# Patient Record
Sex: MALE | Race: WHITE | Employment: OTHER | URBAN - METROPOLITAN AREA
[De-identification: names, ages, dates, MRNs, and addresses within clinical notes are randomized per-mention and may not be internally consistent; named-entity substitution may affect disease eponyms.]

---

## 2017-08-26 ENCOUNTER — HOSPITAL ENCOUNTER (INPATIENT)
Age: 67
LOS: 4 days | Discharge: HOME | DRG: 862 | End: 2017-08-30
Attending: EMERGENCY MEDICINE | Admitting: INTERNAL MEDICINE
Payer: MEDICARE

## 2017-08-26 ENCOUNTER — APPOINTMENT (OUTPATIENT)
Dept: GENERAL RADIOLOGY | Age: 67
DRG: 862 | End: 2017-08-26
Attending: EMERGENCY MEDICINE
Payer: MEDICARE

## 2017-08-26 ENCOUNTER — APPOINTMENT (OUTPATIENT)
Dept: ULTRASOUND IMAGING | Age: 67
DRG: 862 | End: 2017-08-26
Attending: INTERNAL MEDICINE
Payer: MEDICARE

## 2017-08-26 DIAGNOSIS — A41.9 SEPSIS, DUE TO UNSPECIFIED ORGANISM: Primary | ICD-10-CM

## 2017-08-26 PROBLEM — E87.20 LACTIC ACID ACIDOSIS: Status: ACTIVE | Noted: 2017-08-26

## 2017-08-26 PROBLEM — N41.9 PROSTATITIS: Status: ACTIVE | Noted: 2017-08-26

## 2017-08-26 PROBLEM — N39.0 COMPLICATED UTI (URINARY TRACT INFECTION): Status: ACTIVE | Noted: 2017-08-26

## 2017-08-26 PROBLEM — I10 HYPERTENSION: Chronic | Status: ACTIVE | Noted: 2017-08-26

## 2017-08-26 PROBLEM — N28.9 RENAL INSUFFICIENCY: Status: ACTIVE | Noted: 2017-08-26

## 2017-08-26 LAB
ALBUMIN SERPL-MCNC: 3.9 G/DL (ref 3.5–5)
ALBUMIN/GLOB SERPL: 1.1 {RATIO} (ref 1.1–2.2)
ALP SERPL-CCNC: 67 U/L (ref 45–117)
ALT SERPL-CCNC: 49 U/L (ref 12–78)
ANION GAP SERPL CALC-SCNC: 11 MMOL/L (ref 5–15)
APPEARANCE UR: CLEAR
AST SERPL-CCNC: 34 U/L (ref 15–37)
BACTERIA URNS QL MICRO: NEGATIVE /HPF
BASOPHILS # BLD: 0 K/UL (ref 0–0.1)
BASOPHILS NFR BLD: 0 % (ref 0–1)
BILIRUB SERPL-MCNC: 0.7 MG/DL (ref 0.2–1)
BILIRUB UR QL: NEGATIVE
BUN SERPL-MCNC: 14 MG/DL (ref 6–20)
BUN/CREAT SERPL: 10 (ref 12–20)
CALCIUM SERPL-MCNC: 9 MG/DL (ref 8.5–10.1)
CHLORIDE SERPL-SCNC: 101 MMOL/L (ref 97–108)
CO2 SERPL-SCNC: 26 MMOL/L (ref 21–32)
COLOR UR: ABNORMAL
CREAT SERPL-MCNC: 1.38 MG/DL (ref 0.7–1.3)
DIFFERENTIAL METHOD BLD: ABNORMAL
EOSINOPHIL # BLD: 0 K/UL (ref 0–0.4)
EOSINOPHIL NFR BLD: 0 % (ref 0–7)
EPITH CASTS URNS QL MICRO: ABNORMAL /LPF
ERYTHROCYTE [DISTWIDTH] IN BLOOD BY AUTOMATED COUNT: 13.1 % (ref 11.5–14.5)
GLOBULIN SER CALC-MCNC: 3.5 G/DL (ref 2–4)
GLUCOSE SERPL-MCNC: 126 MG/DL (ref 65–100)
GLUCOSE UR STRIP.AUTO-MCNC: NEGATIVE MG/DL
HCT VFR BLD AUTO: 42.4 % (ref 36.6–50.3)
HGB BLD-MCNC: 15 G/DL (ref 12.1–17)
HGB UR QL STRIP: ABNORMAL
HYALINE CASTS URNS QL MICRO: ABNORMAL /LPF (ref 0–5)
KETONES UR QL STRIP.AUTO: NEGATIVE MG/DL
LACTATE SERPL-SCNC: 1.8 MMOL/L (ref 0.4–2)
LACTATE SERPL-SCNC: 2.7 MMOL/L (ref 0.4–2)
LEUKOCYTE ESTERASE UR QL STRIP.AUTO: NEGATIVE
LYMPHOCYTES # BLD: 0.4 K/UL (ref 0.8–3.5)
LYMPHOCYTES NFR BLD: 6 % (ref 12–49)
MCH RBC QN AUTO: 30.2 PG (ref 26–34)
MCHC RBC AUTO-ENTMCNC: 35.4 G/DL (ref 30–36.5)
MCV RBC AUTO: 85.3 FL (ref 80–99)
MONOCYTES # BLD: 0.1 K/UL (ref 0–1)
MONOCYTES NFR BLD: 1 % (ref 5–13)
NEUTS SEG # BLD: 5.9 K/UL (ref 1.8–8)
NEUTS SEG NFR BLD: 93 % (ref 32–75)
NITRITE UR QL STRIP.AUTO: NEGATIVE
PH UR STRIP: 6 [PH] (ref 5–8)
PLATELET # BLD AUTO: 140 K/UL (ref 150–400)
POTASSIUM SERPL-SCNC: 4 MMOL/L (ref 3.5–5.1)
PROT SERPL-MCNC: 7.4 G/DL (ref 6.4–8.2)
PROT UR STRIP-MCNC: NEGATIVE MG/DL
RBC # BLD AUTO: 4.97 M/UL (ref 4.1–5.7)
RBC #/AREA URNS HPF: ABNORMAL /HPF (ref 0–5)
RBC MORPH BLD: ABNORMAL
SODIUM SERPL-SCNC: 138 MMOL/L (ref 136–145)
SP GR UR REFRACTOMETRY: 1.02 (ref 1–1.03)
TROPONIN I SERPL-MCNC: <0.04 NG/ML
UA: UC IF INDICATED,UAUC: ABNORMAL
UROBILINOGEN UR QL STRIP.AUTO: 0.2 EU/DL (ref 0.2–1)
WBC # BLD AUTO: 6.4 K/UL (ref 4.1–11.1)
WBC URNS QL MICRO: ABNORMAL /HPF (ref 0–4)

## 2017-08-26 PROCEDURE — 74011250637 HC RX REV CODE- 250/637: Performed by: EMERGENCY MEDICINE

## 2017-08-26 PROCEDURE — 93005 ELECTROCARDIOGRAM TRACING: CPT

## 2017-08-26 PROCEDURE — 99284 EMERGENCY DEPT VISIT MOD MDM: CPT

## 2017-08-26 PROCEDURE — 84484 ASSAY OF TROPONIN QUANT: CPT | Performed by: EMERGENCY MEDICINE

## 2017-08-26 PROCEDURE — 87086 URINE CULTURE/COLONY COUNT: CPT | Performed by: EMERGENCY MEDICINE

## 2017-08-26 PROCEDURE — 87186 SC STD MICRODIL/AGAR DIL: CPT | Performed by: EMERGENCY MEDICINE

## 2017-08-26 PROCEDURE — 71010 XR CHEST PORT: CPT

## 2017-08-26 PROCEDURE — 87040 BLOOD CULTURE FOR BACTERIA: CPT | Performed by: EMERGENCY MEDICINE

## 2017-08-26 PROCEDURE — 74011250636 HC RX REV CODE- 250/636: Performed by: EMERGENCY MEDICINE

## 2017-08-26 PROCEDURE — 83605 ASSAY OF LACTIC ACID: CPT | Performed by: EMERGENCY MEDICINE

## 2017-08-26 PROCEDURE — 85025 COMPLETE CBC W/AUTO DIFF WBC: CPT | Performed by: EMERGENCY MEDICINE

## 2017-08-26 PROCEDURE — 65270000029 HC RM PRIVATE

## 2017-08-26 PROCEDURE — 74011000258 HC RX REV CODE- 258: Performed by: INTERNAL MEDICINE

## 2017-08-26 PROCEDURE — 81001 URINALYSIS AUTO W/SCOPE: CPT | Performed by: EMERGENCY MEDICINE

## 2017-08-26 PROCEDURE — 74011250636 HC RX REV CODE- 250/636: Performed by: INTERNAL MEDICINE

## 2017-08-26 PROCEDURE — 36415 COLL VENOUS BLD VENIPUNCTURE: CPT | Performed by: EMERGENCY MEDICINE

## 2017-08-26 PROCEDURE — 87077 CULTURE AEROBIC IDENTIFY: CPT | Performed by: EMERGENCY MEDICINE

## 2017-08-26 PROCEDURE — 80053 COMPREHEN METABOLIC PANEL: CPT | Performed by: EMERGENCY MEDICINE

## 2017-08-26 PROCEDURE — 76770 US EXAM ABDO BACK WALL COMP: CPT

## 2017-08-26 PROCEDURE — 96365 THER/PROPH/DIAG IV INF INIT: CPT

## 2017-08-26 PROCEDURE — 74011000258 HC RX REV CODE- 258: Performed by: EMERGENCY MEDICINE

## 2017-08-26 PROCEDURE — 74011250637 HC RX REV CODE- 250/637: Performed by: INTERNAL MEDICINE

## 2017-08-26 RX ORDER — SODIUM CHLORIDE 0.9 % (FLUSH) 0.9 %
5-10 SYRINGE (ML) INJECTION EVERY 8 HOURS
Status: DISCONTINUED | OUTPATIENT
Start: 2017-08-26 | End: 2017-08-30 | Stop reason: HOSPADM

## 2017-08-26 RX ORDER — SILDENAFIL 100 MG/1
100 TABLET, FILM COATED ORAL AS NEEDED
COMMUNITY

## 2017-08-26 RX ORDER — HYDROMORPHONE HYDROCHLORIDE 1 MG/ML
0.5 INJECTION, SOLUTION INTRAMUSCULAR; INTRAVENOUS; SUBCUTANEOUS
Status: DISCONTINUED | OUTPATIENT
Start: 2017-08-26 | End: 2017-08-30 | Stop reason: HOSPADM

## 2017-08-26 RX ORDER — ACETAMINOPHEN 500 MG
1000 TABLET ORAL
Status: COMPLETED | OUTPATIENT
Start: 2017-08-26 | End: 2017-08-26

## 2017-08-26 RX ORDER — ONDANSETRON 2 MG/ML
4 INJECTION INTRAMUSCULAR; INTRAVENOUS
Status: COMPLETED | OUTPATIENT
Start: 2017-08-26 | End: 2017-08-26

## 2017-08-26 RX ORDER — ACETAMINOPHEN 325 MG/1
650 TABLET ORAL
Status: DISCONTINUED | OUTPATIENT
Start: 2017-08-26 | End: 2017-08-30 | Stop reason: HOSPADM

## 2017-08-26 RX ORDER — NALOXONE HYDROCHLORIDE 0.4 MG/ML
0.4 INJECTION, SOLUTION INTRAMUSCULAR; INTRAVENOUS; SUBCUTANEOUS AS NEEDED
Status: DISCONTINUED | OUTPATIENT
Start: 2017-08-26 | End: 2017-08-30 | Stop reason: HOSPADM

## 2017-08-26 RX ORDER — ACETAMINOPHEN 500 MG
1000 TABLET ORAL DAILY
COMMUNITY

## 2017-08-26 RX ORDER — IBUPROFEN 400 MG/1
400 TABLET ORAL
Status: DISPENSED | OUTPATIENT
Start: 2017-08-26 | End: 2017-08-27

## 2017-08-26 RX ORDER — LISINOPRIL AND HYDROCHLOROTHIAZIDE 12.5; 2 MG/1; MG/1
1 TABLET ORAL DAILY
COMMUNITY

## 2017-08-26 RX ORDER — SULFAMETHOXAZOLE AND TRIMETHOPRIM 800; 160 MG/1; MG/1
1 TABLET ORAL 2 TIMES DAILY
COMMUNITY
Start: 2017-08-25 | End: 2017-08-30

## 2017-08-26 RX ORDER — HYDROCODONE BITARTRATE AND ACETAMINOPHEN 5; 325 MG/1; MG/1
1 TABLET ORAL
Status: DISCONTINUED | OUTPATIENT
Start: 2017-08-26 | End: 2017-08-30 | Stop reason: HOSPADM

## 2017-08-26 RX ORDER — DIPHENHYDRAMINE HYDROCHLORIDE 50 MG/ML
12.5 INJECTION, SOLUTION INTRAMUSCULAR; INTRAVENOUS
Status: DISCONTINUED | OUTPATIENT
Start: 2017-08-26 | End: 2017-08-30 | Stop reason: HOSPADM

## 2017-08-26 RX ORDER — SODIUM CHLORIDE 9 MG/ML
100 INJECTION, SOLUTION INTRAVENOUS CONTINUOUS
Status: DISCONTINUED | OUTPATIENT
Start: 2017-08-26 | End: 2017-08-29

## 2017-08-26 RX ORDER — HEPARIN SODIUM 5000 [USP'U]/ML
5000 INJECTION, SOLUTION INTRAVENOUS; SUBCUTANEOUS EVERY 8 HOURS
Status: DISCONTINUED | OUTPATIENT
Start: 2017-08-26 | End: 2017-08-30 | Stop reason: HOSPADM

## 2017-08-26 RX ORDER — SODIUM CHLORIDE 0.9 % (FLUSH) 0.9 %
5-10 SYRINGE (ML) INJECTION AS NEEDED
Status: DISCONTINUED | OUTPATIENT
Start: 2017-08-26 | End: 2017-08-28 | Stop reason: SDUPTHER

## 2017-08-26 RX ORDER — HYDROCHLOROTHIAZIDE 12.5 MG/1
12.5 CAPSULE ORAL DAILY
COMMUNITY
End: 2017-08-26

## 2017-08-26 RX ORDER — ONDANSETRON 2 MG/ML
4 INJECTION INTRAMUSCULAR; INTRAVENOUS
Status: DISCONTINUED | OUTPATIENT
Start: 2017-08-26 | End: 2017-08-30 | Stop reason: HOSPADM

## 2017-08-26 RX ORDER — DOCUSATE SODIUM 100 MG/1
100 CAPSULE, LIQUID FILLED ORAL 2 TIMES DAILY
Status: DISCONTINUED | OUTPATIENT
Start: 2017-08-26 | End: 2017-08-30 | Stop reason: HOSPADM

## 2017-08-26 RX ORDER — SODIUM CHLORIDE 0.9 % (FLUSH) 0.9 %
5-10 SYRINGE (ML) INJECTION AS NEEDED
Status: DISCONTINUED | OUTPATIENT
Start: 2017-08-26 | End: 2017-08-30 | Stop reason: HOSPADM

## 2017-08-26 RX ADMIN — SODIUM CHLORIDE 125 ML/HR: 900 INJECTION, SOLUTION INTRAVENOUS at 15:18

## 2017-08-26 RX ADMIN — ACETAMINOPHEN 650 MG: 325 TABLET ORAL at 19:46

## 2017-08-26 RX ADMIN — SODIUM CHLORIDE 2124 ML: 900 INJECTION, SOLUTION INTRAVENOUS at 14:14

## 2017-08-26 RX ADMIN — PIPERACILLIN SODIUM,TAZOBACTAM SODIUM 3.38 G: 3; .375 INJECTION, POWDER, FOR SOLUTION INTRAVENOUS at 14:55

## 2017-08-26 RX ADMIN — CEFTRIAXONE SODIUM 1 G: 1 INJECTION, POWDER, FOR SOLUTION INTRAMUSCULAR; INTRAVENOUS at 14:15

## 2017-08-26 RX ADMIN — Medication 10 ML: at 15:18

## 2017-08-26 RX ADMIN — PIPERACILLIN SODIUM,TAZOBACTAM SODIUM 3.38 G: 3; .375 INJECTION, POWDER, FOR SOLUTION INTRAVENOUS at 22:32

## 2017-08-26 RX ADMIN — DOCUSATE SODIUM 100 MG: 100 CAPSULE ORAL at 17:05

## 2017-08-26 RX ADMIN — HEPARIN SODIUM 5000 UNITS: 5000 INJECTION, SOLUTION INTRAVENOUS; SUBCUTANEOUS at 22:32

## 2017-08-26 RX ADMIN — Medication 10 ML: at 22:33

## 2017-08-26 RX ADMIN — ACETAMINOPHEN 1000 MG: 500 TABLET ORAL at 14:19

## 2017-08-26 RX ADMIN — ONDANSETRON 4 MG: 2 INJECTION INTRAMUSCULAR; INTRAVENOUS at 14:17

## 2017-08-26 RX ADMIN — SODIUM CHLORIDE 125 ML/HR: 900 INJECTION, SOLUTION INTRAVENOUS at 21:15

## 2017-08-26 NOTE — H&P
Godwin Ackerman [facility] [address], [place], [address] [facility]  [phone]    Admission History and Physical      NAME:  True Herrera   :   1950   MRN:  [id number]     PCP:  No primary care provider on file. Date/Time:  2017         Subjective:     CHIEF COMPLAINT: fevers, chills     HISTORY OF PRESENT ILLNESS:     The patient is a 70 yo hx of HTN, prostate CA, presented w/ sepsis, UTI/prostatitis. The patient received a prostate Bx 2 days ago and developed fevers and chills x1 day. His Urologist prescribed oral Bactrim yesterday, but his symptoms have gotten worse. Denied chest pain, SOB, nausea, vomiting, diarrhea. In the ED, temp was 101.0, . WBC 6.4, Cr 1.38. Allergies   Allergen Reactions    Ciprofloxacin Other (comments)     Joint aches       Prior to Admission medications    Medication Sig Start Date End Date Taking? Authorizing Provider   LISINOPRIL PO Take  by mouth. Yes Historical Provider   hydroCHLOROthiazide (MICROZIDE) 12.5 mg capsule Take 12.5 mg by mouth daily.    Yes Historical Provider       Past Medical History:   Diagnosis Date    HTN (hypertension)     Prostate CA ([address]         Past Surgical History:   Procedure Laterality Date    HX ORTHOPAEDIC         Social History   Substance Use Topics    Smoking status: Never Smoker    Smokeless tobacco: Never Used    Alcohol use Yes        Family History   Problem Relation Age of Onset    Prostate Cancer Brother         Review of Systems:  (bold if positive, if negative)    Gen:  , fever, chills, Eyes:  ENT:  CVS:  Pulm:  GI:    :    MS:  Skin:  Psych:  Endo:    Hem:  Renal:    Neuro:          Objective:      VITALS:    Vital signs reviewed; most recent are:    Visit Vitals    /65    Pulse (!) 106    Temp (!) 101 °F (38.3 °C)    Resp 30    Ht 5' 8\" (1.727 m)    Wt 70.8 kg (156 lb)    SpO2 92%    BMI 23.72 kg/m2     SpO2 Readings from Last 6 Encounters:   17 92% No intake or output data in the 24 hours ending 08/26/17 4157     Exam:     Physical Exam:    Gen:  Well-developed, well-nourished, in mild distress  HEENT:  Pink conjunctivae, PERRL, hearing intact to voice, moist mucous membranes  Neck:  Supple, without masses, thyroid non-tender  Resp:  No accessory muscle use, clear breath sounds without wheezes rales or rhonchi  Card: Tachy, no murmurs, normal S1, S2 without thrills, bruits or peripheral edema  Abd:  Soft, non-tender, non-distended, normoactive bowel sounds are present  Lymph:  No cervical adenopathy  Musc:  No cyanosis or clubbing  Skin:  No rashes  Neuro:  Cranial nerves 3-12 are grossly intact, follows commands appropriately  Psych:  Alert with good insight. Oriented to person, place, and time    Labs:    Recent Labs      08/26/17   1346   WBC  6.4   HGB  15.0   HCT  42.4   PLT  140*     Recent Labs      08/26/17   1346   NA  138   K  4.0   CL  101   CO2  26   GLU  126*   BUN  14   CREA  1.38*   CA  9.0   ALB  3.9   TBILI  0.7   SGOT  34   ALT  49     No results found for: GLUCPOC  No results for input(s): PH, PCO2, PO2, HCO3, FIO2 in the last 72 hours. No results for input(s): INR in the last 72 hours. No lab exists for component: INREXT    Radiology and EKG reviewed:   none    **Old Records reviewed in [facility]**       Assessment/Plan:       Principal Problem:    68 yo hx of HTN, prostate CA, presented w/ sepsis, UTI/prostatitis    1) Sepsis/Complicated UTI (urinary tract infection)/Prostatitis: 2/4 SIRS on admission. Due to recent prostate Bx. Failed outpatient Bactrim. Will check blood Cx, urine Cx. Empirically start on IV Zosyn. Consult Urology    2) Lactic acid acidosis: due to above. Will cont IVF, monitor lactate    3) Renal insufficiency: due to sepsis, dehydration. Will check renal U/S.   Cont IVF, monitor BMP    4) Hypertension: hold lisinopril/HCTZ due to sepsis    Code: Full    Risk of deterioration: high      Total time spent with patient: 60 min                  Care Plan discussed with: Patient, family, nursing    Discussed:  Care Plan    Prophylaxis:  Hep SQ    Probable Disposition:  Home w/Family           ___________________________________________________    Attending Physician: Carlton Greene MD

## 2017-08-26 NOTE — IP AVS SNAPSHOT
Summary of Care Report The Summary of Care report has been created to help improve care coordination. Users with access to [email or web address] [address] (Web-based application) may access additional patient information including the Discharge Summary. If you are not currently a [address] user and need more information, please call the number listed below in the [place] section and ask to be connected with Medical Records. Facility Information Name Address Phone [place] [id number] [phone] Patient Information Patient Name Sex CHRISTOFER Marquez ([id number]) Male 1950 Discharge Information Admitting Provider Service Area Unit Marilee Fraire MD / Josefina 2 / [phone] Discharge Provider Discharge Date/Time Discharge Disposition Destination (none) 2017 (Pending) AHR (none) Patient Language Language ENGLISH [13] Hospital Problems as of 2017  Reviewed: 2017 10:53 AM by Jona Torres MD  
  
  
  
 Class Noted - Resolved Last Modified POA Active Problems Prostatitis  2017 - Present 2017 by Marilee Fraire MD Yes Entered by Marilee Fraire MD  
  Lactic acid acidosis  2017 - Present 2017 by Marilee Fraire MD Yes Entered by Marilee Fraire MD  
  Renal insufficiency  2017 - Present 2017 by Marilee Fraire MD Yes Entered by Marilee Fraire MD  
  Hypertension (Chronic)  2017 - Present 2017 by Marilee Fraire MD No  
  Entered by Mairlee Fraire MD  
  
Non-Hospital Problems as of 2017  Reviewed: 2017 10:53 AM by Jona Torres MD  
 None You are allergic to the following Allergen Reactions Ciprofloxacin Other (comments) Joint aches Current Discharge Medication List  
  
 START taking these medications Dose & Instructions Dispensing Information Comments  
 cefdinir 300 mg capsule Commonly known as:  OMNICEF Dose:  300 mg Take 1 Cap by mouth two (2) times a day. Quantity:  20 Cap Refills:  0 Lactobacillus acidophilus Cap Commonly known as:  BACID Dose:  2 Cap Take 2 Caps by mouth two (2) times a day. Quantity:  20 Cap Refills:  0 CONTINUE these medications which have NOT CHANGED Dose & Instructions Dispensing Information Comments  
 lisinopril-hydroCHLOROthiazide 20-12.5 mg per tablet Commonly known as:  Terryl Range Dose:  1 Tab Take 1 Tab by mouth daily. Refills:  0  
   
 sildenafil citrate 100 mg tablet Commonly known as:  VIAGRA Dose:  100 mg Take 100 mg by mouth as needed. Refills:  0  
   
 TYLENOL EXTRA STRENGTH 500 mg tablet Generic drug:  acetaminophen Dose:  1000 mg Take 1,000 mg by mouth daily. Refills:  0 STOP taking these medications Comments BACTRIM -800 mg per tablet Generic drug:  trimethoprim-sulfamethoxazole Follow-up Information Follow up With Details Comments Contact Info Phys Other, MD   Patient can only remember the practice name and not the physician Discharge Instructions ACUTE DIAGNOSES: 
Sepsis ([facility] CHRONIC MEDICAL DIAGNOSES: 
Problem List as of 8/30/2017  Date Reviewed: 8/30/2017 Codes Class Noted - Resolved Prostatitis ICD-10-CM: N41.9 ICD-9-CM: 601.9  8/26/2017 - Present Lactic acid acidosis ICD-10-CM: E87.2 ICD-9-CM: 276.2  8/26/2017 - Present Renal insufficiency ICD-10-CM: N28.9 ICD-9-CM: 593.9  8/26/2017 - Present Hypertension (Chronic) ICD-10-CM: I10 
ICD-9-CM: 401.9  8/26/2017 - Present * (Principal)RESOLVED: Sepsis ([facility] ICD-10-CM: A41.9 ICD-9-CM: 038.9, 995.91  8/26/2017 - 8/30/2017 RESOLVED: Complicated UTI (urinary tract infection) ICD-10-CM: N39.0 ICD-9-CM: 599.0  8/26/2017 - 8/30/2017 DISCHARGE MEDICATIONS:  
  
 
 
· It is important that you take the medication exactly as they are prescribed. · Keep your medication in the bottles provided by the pharmacist and keep a list of the medication names, dosages, and times to be taken in your wallet. · Do not take other medications without consulting your doctor. DIET:  Regular Diet ACTIVITY: Activity as tolerated ADDITIONAL INFORMATION: If you experience any of the following symptoms then please call your primary care physician or return to the emergency room if you cannot get hold of your doctor: Fever, chills, nausea, vomiting, diarrhea, change in mentation, falling, bleeding, shortness of breath. FOLLOW UP CARE: 
Dr. Keenan Holter Other, MD  you are to call and set up an appointment to see them in 2 weeks. Follow-up with Dr Rukhsana Murcia in 1-2 weeks Information obtained by : 
I understand that if any problems occur once I am at home I am to contact my physician. I understand and acknowledge receipt of the instructions indicated above. Physician's or R.N.'s Signature                                                                  Date/Time Patient or Representative Signature                                                          Date/Time Chart Review Routing History No Routing History on File

## 2017-08-26 NOTE — ED NOTES
Pt informed this RN he is not supposed to take ibuprofen this week d/t a prostate biopsy he had on THursday. ROSANNA Carrasco informed. 4:08 PM  RN called Dr. Sylvia Velazco and informed him that pt will not take ibuprofen today d/t urologists warning.

## 2017-08-26 NOTE — IP AVS SNAPSHOT
[address] 
[phone] Patient: Sushant Landers MRN: [id number] OOX:0/84/8885 You are allergic to the following Allergen Reactions Ciprofloxacin Other (comments) Joint aches Recent Documentation Height Weight BMI Smoking Status 1.727 m 88 kg 29.5 kg/m2 Never Smoker Unresulted Labs Order Current Status CULTURE, BLOOD Preliminary result CULTURE, BLOOD Preliminary result CULTURE, STOOL Preliminary result Emergency Contacts Name Discharge Info Relation Home Work Mobile DerianKarolina DISCHARGE CAREGIVER [3] Spouse [3] [phone] About your hospitalization You were admitted on:  August 26, 2017 You last received care in the:  [facility]  MED SURG 2 You were discharged on:  August 30, 2017 Unit phone number:  [phone] Why you were hospitalized Your primary diagnosis was:  Sepsis (H[facility]) Your diagnoses also included:  Complicated Uti (Urinary Tract Infection), Prostatitis, Lactic Acid Acidosis, Renal Insufficiency, Hypertension Providers Seen During Your Hospitalizations Provider Role Specialty Primary office phone Alejandra Vaughn MD Attending Provider Emergency Medicine [phone] Laura Goldman MD Attending Provider Internal Medicine [phone] Natalia Eugene MD Attending Provider Hospitalist [phone] Your Primary Care Physician (PCP) Primary Care Physician Office Phone Office Fax OTHER, PHYS ** None ** ** None ** Follow-up Information Follow up With Details Comments Contact Info Rodolfo Khan MD   Patient can only remember the practice name and not the physician Current Discharge Medication List  
  
START taking these medications Dose & Instructions Dispensing Information Comments Morning Noon Evening Bedtime  
 cefdinir 300 mg capsule Commonly known as:  OMNICEF Your last dose was: Your next dose is:    
   
   
 Dose:  300 mg Take 1 Cap by mouth two (2) times a day. Quantity:  20 Cap Refills:  0 Lactobacillus acidophilus Cap Commonly known as:  BACID Your last dose was: Your next dose is:    
   
   
 Dose:  2 Cap Take 2 Caps by mouth two (2) times a day. Quantity:  20 Cap Refills:  0 CONTINUE these medications which have NOT CHANGED Dose & Instructions Dispensing Information Comments Morning Noon Evening Bedtime  
 lisinopril-hydroCHLOROthiazide 20-12.5 mg per tablet Commonly known as:  Lyle Ailey Your last dose was: Your next dose is:    
   
   
 Dose:  1 Tab Take 1 Tab by mouth daily. Refills:  0  
     
   
   
   
  
 sildenafil citrate 100 mg tablet Commonly known as:  VIAGRA Your last dose was: Your next dose is:    
   
   
 Dose:  100 mg Take 100 mg by mouth as needed. Refills:  0  
     
   
   
   
  
 TYLENOL EXTRA STRENGTH 500 mg tablet Generic drug:  acetaminophen Your last dose was: Your next dose is:    
   
   
 Dose:  1000 mg Take 1,000 mg by mouth daily. Refills:  0 STOP taking these medications BACTRIM -800 mg per tablet Generic drug:  trimethoprim-sulfamethoxazole Where to Get Your Medications Information on where to get these meds will be given to you by the nurse or doctor. ! Ask your nurse or doctor about these medications  
  cefdinir 300 mg capsule Lactobacillus acidophilus Cap Discharge Instructions ACUTE DIAGNOSES: 
Sepsis ([facility] CHRONIC MEDICAL DIAGNOSES: 
Problem List as of 8/30/2017  Date Reviewed: 8/30/2017 Codes Class Noted - Resolved Prostatitis ICD-10-CM: N41.9 ICD-9-CM: 601.9  8/26/2017 - Present Lactic acid acidosis ICD-10-CM: E87.2 ICD-9-CM: 276.2  8/26/2017 - Present Renal insufficiency ICD-10-CM: N28.9 ICD-9-CM: 593.9  8/26/2017 - Present Hypertension (Chronic) ICD-10-CM: I10 
ICD-9-CM: 401.9  8/26/2017 - Present * (Principal)RESOLVED: Sepsis ([address] ICD-10-CM: A41.9 ICD-9-CM: 038.9, 995.91  8/26/2017 - 8/30/2017 RESOLVED: Complicated UTI (urinary tract infection) ICD-10-CM: N39.0 ICD-9-CM: 599.0  8/26/2017 - 8/30/2017 DISCHARGE MEDICATIONS:  
  
 
 
· It is important that you take the medication exactly as they are prescribed. · Keep your medication in the bottles provided by the pharmacist and keep a list of the medication names, dosages, and times to be taken in your wallet. · Do not take other medications without consulting your doctor. DIET:  Regular Diet ACTIVITY: Activity as tolerated ADDITIONAL INFORMATION: If you experience any of the following symptoms then please call your primary care physician or return to the emergency room if you cannot get hold of your doctor: Fever, chills, nausea, vomiting, diarrhea, change in mentation, falling, bleeding, shortness of breath. FOLLOW UP CARE: 
Dr. Dell Khan MD  you are to call and set up an appointment to see them in 2 weeks. Follow-up with Dr Laine Colivn in 1-2 weeks Information obtained by : 
I understand that if any problems occur once I am at home I am to contact my physician. I understand and acknowledge receipt of the instructions indicated above. Physician's or R.N.'s Signature                                                                  Date/Time Patient or Representative Signature                                                          Date/Time Discharge Orders None [employer] Announcement We are excited to announce that we are making your provider's discharge notes available to you in [employer]. You will see these notes when they are completed and signed by the physician that discharged you from your recent hospital stay. If you have any questions or concerns about any information you see in [employer], please call the Health Information Department where you were seen or reach out to your Primary Care Provider for more information about your plan of care. Introducing [facility]! Zeinab Casanova introduces [employer] patient portal. Now you can access parts of your medical record, email your doctor's office, and request medication refills online. 1. In your internet browser, go to [email or web address] 2. Click on the First Time User? Click Here link in the Sign In box. You will see the New Member Sign Up page. 3. Enter your [employer] Access Code exactly as it appears below. You will not need to use this code after youve completed the sign-up process. If you do not sign up before the expiration date, you must request a new code. · [employer] Access Code: [email or web address] Expires: 11/28/2017  3:01 PM 
 
4. Enter the last four digits of your Social Security Number (xxxx) and Date of Birth (mm/dd/yyyy) as indicated and click Submit. You will be taken to the next sign-up page. 5. Create a [employer] ID. This will be your [employer] login ID and cannot be changed, so think of one that is secure and easy to remember. 6. Create a [employer] password. You can change your password at any time. 7. Enter your Password Reset Question and Answer. This can be used at a later time if you forget your password. 8. Enter your e-mail address. You will receive e-mail notification when new information is available in [address] 9. Click Sign Up. You can now view and download portions of your medical record. 10. Click the Download Summary menu link to download a portable copy of your medical information. If you have questions, please visit the Frequently Asked Questions section of the [employer] website. Remember, [employer] is NOT to be used for urgent needs. For medical emergencies, dial 911. Now available from your iPhone and Android! General Information Please provide this summary of care documentation to your next provider. Patient Signature:  ____________________________________________________________ Date:  ____________________________________________________________  
  
Kaveh Snipe Provider Signature:  ____________________________________________________________ Date:  ____________________________________________________________

## 2017-08-26 NOTE — IP AVS SNAPSHOT
Sandra Omar 
 
 
 [facility] [address] [facility] 
[phone] Patient: Martha Alvarado MRN: [id number] GALDINO:[birth year] Current Discharge Medication List  
  
START taking these medications Dose & Instructions Dispensing Information Comments Morning Noon Evening Bedtime  
 cefdinir 300 mg capsule Commonly known as:  OMNICEF Your last dose was: Your next dose is:    
   
   
 Dose:  300 mg Take 1 Cap by mouth two (2) times a day. Quantity:  20 Cap Refills:  0 Lactobacillus acidophilus Cap Commonly known as:  BACID Your last dose was: Your next dose is:    
   
   
 Dose:  2 Cap Take 2 Caps by mouth two (2) times a day. Quantity:  20 Cap Refills:  0 CONTINUE these medications which have NOT CHANGED Dose & Instructions Dispensing Information Comments Morning Noon Evening Bedtime  
 lisinopril-hydroCHLOROthiazide 20-12.5 mg per tablet Commonly known as:  Wellborn Sabsalvatore Your last dose was: Your next dose is:    
   
   
 Dose:  1 Tab Take 1 Tab by mouth daily. Refills:  0  
     
   
   
   
  
 sildenafil citrate 100 mg tablet Commonly known as:  VIAGRA Your last dose was: Your next dose is:    
   
   
 Dose:  100 mg Take 100 mg by mouth as needed. Refills:  0  
     
   
   
   
  
 TYLENOL EXTRA STRENGTH 500 mg tablet Generic drug:  acetaminophen Your last dose was: Your next dose is:    
   
   
 Dose:  1000 mg Take 1,000 mg by mouth daily. Refills:  0 STOP taking these medications BACTRIM -800 mg per tablet Generic drug:  trimethoprim-sulfamethoxazole Where to Get Your Medications Information on where to get these meds will be given to you by the nurse or doctor. ! Ask your nurse or doctor about these medications cefdinir 300 mg capsule Lactobacillus acidophilus Cap

## 2017-08-26 NOTE — ED NOTES
Dr. Darwin Laureano at bedside. Dr. Darwin Laureano aware rocephin already started as ordered before he D/C ordered. Dr. Darwin Laureano states, \"okay no problem. \"

## 2017-08-26 NOTE — ED NOTES
TRANSFER - OUT REPORT:    Verbal report given to ROSANNA Carrasco on Sushant Landers  being transferred to medical unit for routine progression of care       Report consisted of patients Situation, Background, Assessment and   Recommendations(SBAR). Information from the following report(s) SBAR, Kardex, ED Summary, MAR, Recent Results and Med Rec Status was reviewed with the receiving nurse. Lines:   Peripheral IV 08/26/17 Left Antecubital (Active)   Site Assessment Clean, dry, & intact 8/26/2017  1:58 PM   Phlebitis Assessment 0 8/26/2017  1:58 PM   Infiltration Assessment 0 8/26/2017  1:58 PM   Dressing Status Clean, dry, & intact; New 8/26/2017  1:58 PM   Hub Color/Line Status Pink;Flushed;Patent 8/26/2017  1:58 PM   Action Taken Blood drawn 8/26/2017  1:58 PM        Opportunity for questions and clarification was provided. Code Purple Transfer SBAR        SIRS Criteria Temp (degrees) >100.9 F (38.9 C) or <90.8 F (36 C), HR >90 bpm    Mental Status Level of Consciousness: Alert    Labs/Lactic Acid Lactate 1 result: 2.7, Repeat Lactate due: If yes, time due: will draw prior to patient going to the floor    Fluid resuscitation total 2124mL Infusing. RN Barbara Zhong states she will start second bag on the floor. Antibiotics Due?   NO    Vasopressor Hanging  No    Visit Vitals    /49    Pulse 86    Temp (!) 101 °F (38.3 °C)    Resp 22    Ht 5' 8\" (1.727 m)    Wt 70.8 kg (156 lb)    SpO2 91%    BMI 23.72 kg/m2

## 2017-08-26 NOTE — PROGRESS NOTES
[facility]: MED RECONCILIATION    Comments/Recommendations: Wife to return shortly with medication bottle and will confirm dose of lisinopril-hctz. MD will be updated accordingly    Medications added:   · Tylenol  · Viagra      Information obtained from: patient      Allergies: Ciprofloxacin    Prior to Admission Medications:     Medication Documentation Review Audit       Reviewed by Conor Sebastian (Pharmacist) on 08/26/17 at 1449         Medication Sig Documenting Provider Last Dose Status Taking?      acetaminophen (TYLENOL EXTRA STRENGTH) 500 mg tablet Take 1,000 mg by mouth daily. Historical Provider 8/25/2017 AM Active Yes    LISINOPRIL-HYDROCHLOROTHIAZIDE PO Take 1 Tab by mouth daily. Historical Provider 8/25/2017 AM Active Yes    sildenafil citrate (VIAGRA) 100 mg tablet Take 100 mg by mouth as needed. Historical Provider  Active Yes    trimethoprim-sulfamethoxazole (BACTRIM DS) 160-800 mg per tablet Take 1 Tab by mouth two (2) times a day. Historical Provider 8/25/2017 PM Active Yes                    Thank you,  Lulu Cuevas, Pharm. D.

## 2017-08-26 NOTE — CONSULTS
Urology  8/26/17  Sepsis post prostate biopsy. Note dictated. Agree with IV abx and fluid resussitation. Will follow.   Merlinda Hamilton MD

## 2017-08-26 NOTE — ED TRIAGE NOTES
Prostate biopsy on Thursday. Started with chills and fever last night. Vomited x 1 in triage. Called on call MD, was put on bactrim last night. Took 2 bactrim at 2200 and 0700.  No SOB

## 2017-08-26 NOTE — ED PROVIDER NOTES
HPI Comments: 69 y.o. male with past medical history significant for diverticulitis, s/p prostate biopsy who presents accompanied by wife with chief complaint of fever. Patient complains of fever, chills, fatigue/weakness, and slight nausea that began last night around 2100. Patient states the chills began first. Patient states his temperature has not fluctuated much. Patient states he's had two episodes of diarrhea today and vomited slightly while in the ED. Patient states he feels worse today. Patient had a prostate biopsy on 8/24/17 with [facility] and was started on Bactrim 800 mg after calling the on-call urologist last night. Patient states he's had two doses so far. Patient states he last took 2 Tylenol 350 mg around 0915 without much change. Patient denies urinary sx. There are no other acute medical concerns at this time. PCP: No primary care provider on file. Urologist: FLAQUITO Morgan MD     Note written by Didi Chaudhary, as dictated by Lula Heath MD 1:35 PM     The history is provided by the patient. No  was used. Past Medical History:   Diagnosis Date    HTN (hypertension)     Prostate CA ([address]        Past Surgical History:   Procedure Laterality Date    HX ORTHOPAEDIC           Family History:   Problem Relation Age of Onset    Prostate Cancer Brother        Social History     Social History    Marital status: N/A     Spouse name: N/A    Number of children: N/A    Years of education: N/A     Occupational History    Not on file. Social History Main Topics    Smoking status: Never Smoker    Smokeless tobacco: Never Used    Alcohol use Yes    Drug use: No    Sexual activity: Not on file     Other Topics Concern    Not on file     Social History Narrative    No narrative on file         ALLERGIES: Ciprofloxacin    Review of Systems   Constitutional: Positive for chills, fatigue and fever. Negative for appetite change.    HENT: Negative for congestion, rhinorrhea and sore throat. Respiratory: Negative for cough and shortness of breath. Cardiovascular: Negative for chest pain and leg swelling. Gastrointestinal: Positive for diarrhea, nausea and vomiting. Negative for abdominal pain and constipation. Genitourinary: Negative for difficulty urinating and dysuria. Musculoskeletal: Negative for back pain and neck pain. Skin: Negative for rash and wound. Neurological: Negative for headaches. All other systems reviewed and are negative. Vitals:    08/26/17 1325 08/26/17 1400 08/26/17 1427 08/26/17 1428   BP: 134/65 157/65     Pulse: (!) 113 (!) 121 (!) 106    Resp: 20 26 30    Temp: (!) 101 °F (38.3 °C)      SpO2: 96%  99% 92%   Weight: 70.8 kg (156 lb)      Height: 5' 8\" (1.727 m)               Physical Exam   Constitutional: He is oriented to person, place, and time. He appears well-developed and well-nourished. He appears ill (moderately). HENT:   Head: Normocephalic and atraumatic. Eyes: Conjunctivae are normal. No scleral icterus. Neck: Neck supple. No tracheal deviation present. Cardiovascular: Regular rhythm, normal heart sounds and intact distal pulses. Tachycardia present. Exam reveals no gallop and no friction rub. No murmur heard. Pulmonary/Chest: Effort normal and breath sounds normal. He has no wheezes. He has no rales. Abdominal: Soft. He exhibits no distension. There is no tenderness. There is no rebound and no guarding. Musculoskeletal: He exhibits no edema. Neurological: He is alert and oriented to person, place, and time. Skin: Skin is warm and dry. No rash noted. Psychiatric: He has a normal mood and affect. Nursing note and vitals reviewed. Note written by Didi Rosas, as dictated by Yanick Erickson MD 1:35 PM      [facility]  ED Course       Procedures    ED EKG interpretation:  Rhythm: sinus tachycardia. Rate (approx.): 120; RBBB; Inferior Q waves.   Note written by Camden Hui Scribe, as dictated by Manjit Carrillo MD 2:05 PM     CONSULT NOTE:  2:23 Aubrey Chinchilla MD spoke with Dr. Adi Gil, Consult for Hospitalist.  Discussed available diagnostic tests and clinical findings. He is in agreement with care plans as outlined. Dr. Adi Gil will admit for sepsis s/p prostate biopsy. CONSULT NOTE:  2:46 PM Manjit Carrillo MD spoke with Dr. Tristian Hernandez, Consult for Urology. Discussed available diagnostic tests and clinical findings. He is in agreement with care plans as outlined. Dr. Tristian Hernandez agrees with admission to hospitalist and he will see him tomorrow. Total critical care time spend exclusive of procedures:  35 min. Manjit Carrillo MD  3:16 PM    A/P: sepsis S/P prostate Bx - getting fluids and broad-spectrum AB's; BP stable thus far; lactate - 2.7; admit.   Manjit Carrillo MD  3:17 PM

## 2017-08-27 LAB
ALBUMIN SERPL-MCNC: 2.8 G/DL (ref 3.5–5)
ALBUMIN/GLOB SERPL: 1 {RATIO} (ref 1.1–2.2)
ALP SERPL-CCNC: 40 U/L (ref 45–117)
ALT SERPL-CCNC: 69 U/L (ref 12–78)
ANION GAP SERPL CALC-SCNC: 8 MMOL/L (ref 5–15)
AST SERPL-CCNC: 51 U/L (ref 15–37)
BILIRUB DIRECT SERPL-MCNC: 0.1 MG/DL (ref 0–0.2)
BILIRUB SERPL-MCNC: 0.5 MG/DL (ref 0.2–1)
BUN SERPL-MCNC: 12 MG/DL (ref 6–20)
BUN/CREAT SERPL: 9 (ref 12–20)
CALCIUM SERPL-MCNC: 7.8 MG/DL (ref 8.5–10.1)
CHLORIDE SERPL-SCNC: 106 MMOL/L (ref 97–108)
CO2 SERPL-SCNC: 25 MMOL/L (ref 21–32)
CREAT SERPL-MCNC: 1.28 MG/DL (ref 0.7–1.3)
ERYTHROCYTE [DISTWIDTH] IN BLOOD BY AUTOMATED COUNT: 13.4 % (ref 11.5–14.5)
GLOBULIN SER CALC-MCNC: 2.8 G/DL (ref 2–4)
GLUCOSE SERPL-MCNC: 111 MG/DL (ref 65–100)
HCT VFR BLD AUTO: 36.5 % (ref 36.6–50.3)
HGB BLD-MCNC: 12.3 G/DL (ref 12.1–17)
LACTATE SERPL-SCNC: 0.8 MMOL/L (ref 0.4–2)
MAGNESIUM SERPL-MCNC: 1.7 MG/DL (ref 1.6–2.4)
MCH RBC QN AUTO: 29.2 PG (ref 26–34)
MCHC RBC AUTO-ENTMCNC: 33.7 G/DL (ref 30–36.5)
MCV RBC AUTO: 86.7 FL (ref 80–99)
PHOSPHATE SERPL-MCNC: 2.8 MG/DL (ref 2.6–4.7)
PLATELET # BLD AUTO: 110 K/UL (ref 150–400)
POTASSIUM SERPL-SCNC: 3.8 MMOL/L (ref 3.5–5.1)
PROT SERPL-MCNC: 5.6 G/DL (ref 6.4–8.2)
RBC # BLD AUTO: 4.21 M/UL (ref 4.1–5.7)
SODIUM SERPL-SCNC: 139 MMOL/L (ref 136–145)
WBC # BLD AUTO: 6.6 K/UL (ref 4.1–11.1)

## 2017-08-27 PROCEDURE — 84100 ASSAY OF PHOSPHORUS: CPT | Performed by: INTERNAL MEDICINE

## 2017-08-27 PROCEDURE — 74011250637 HC RX REV CODE- 250/637: Performed by: INTERNAL MEDICINE

## 2017-08-27 PROCEDURE — 83605 ASSAY OF LACTIC ACID: CPT | Performed by: INTERNAL MEDICINE

## 2017-08-27 PROCEDURE — 80048 BASIC METABOLIC PNL TOTAL CA: CPT | Performed by: INTERNAL MEDICINE

## 2017-08-27 PROCEDURE — 80076 HEPATIC FUNCTION PANEL: CPT | Performed by: INTERNAL MEDICINE

## 2017-08-27 PROCEDURE — 74011000258 HC RX REV CODE- 258: Performed by: INTERNAL MEDICINE

## 2017-08-27 PROCEDURE — 85027 COMPLETE CBC AUTOMATED: CPT | Performed by: INTERNAL MEDICINE

## 2017-08-27 PROCEDURE — 74011250636 HC RX REV CODE- 250/636: Performed by: INTERNAL MEDICINE

## 2017-08-27 PROCEDURE — 83735 ASSAY OF MAGNESIUM: CPT | Performed by: INTERNAL MEDICINE

## 2017-08-27 PROCEDURE — 65270000029 HC RM PRIVATE

## 2017-08-27 PROCEDURE — 36415 COLL VENOUS BLD VENIPUNCTURE: CPT | Performed by: INTERNAL MEDICINE

## 2017-08-27 RX ORDER — IBUPROFEN 400 MG/1
400 TABLET ORAL
Status: DISCONTINUED | OUTPATIENT
Start: 2017-08-27 | End: 2017-08-30 | Stop reason: HOSPADM

## 2017-08-27 RX ADMIN — IBUPROFEN 400 MG: 400 TABLET, FILM COATED ORAL at 14:09

## 2017-08-27 RX ADMIN — ACETAMINOPHEN 650 MG: 325 TABLET ORAL at 00:21

## 2017-08-27 RX ADMIN — HYDROCODONE BITARTRATE AND ACETAMINOPHEN 1 TABLET: 5; 325 TABLET ORAL at 01:14

## 2017-08-27 RX ADMIN — HEPARIN SODIUM 5000 UNITS: 5000 INJECTION, SOLUTION INTRAVENOUS; SUBCUTANEOUS at 22:05

## 2017-08-27 RX ADMIN — DOCUSATE SODIUM 100 MG: 100 CAPSULE ORAL at 09:34

## 2017-08-27 RX ADMIN — PIPERACILLIN SODIUM,TAZOBACTAM SODIUM 3.38 G: 3; .375 INJECTION, POWDER, FOR SOLUTION INTRAVENOUS at 22:05

## 2017-08-27 RX ADMIN — SODIUM CHLORIDE 125 ML/HR: 900 INJECTION, SOLUTION INTRAVENOUS at 13:27

## 2017-08-27 RX ADMIN — HEPARIN SODIUM 5000 UNITS: 5000 INJECTION, SOLUTION INTRAVENOUS; SUBCUTANEOUS at 06:41

## 2017-08-27 RX ADMIN — ACETAMINOPHEN 650 MG: 325 TABLET ORAL at 07:39

## 2017-08-27 RX ADMIN — PIPERACILLIN SODIUM,TAZOBACTAM SODIUM 3.38 G: 3; .375 INJECTION, POWDER, FOR SOLUTION INTRAVENOUS at 06:40

## 2017-08-27 RX ADMIN — ACETAMINOPHEN 650 MG: 325 TABLET ORAL at 15:53

## 2017-08-27 RX ADMIN — PIPERACILLIN SODIUM,TAZOBACTAM SODIUM 3.38 G: 3; .375 INJECTION, POWDER, FOR SOLUTION INTRAVENOUS at 14:07

## 2017-08-27 RX ADMIN — SODIUM CHLORIDE 125 ML/HR: 900 INJECTION, SOLUTION INTRAVENOUS at 22:05

## 2017-08-27 RX ADMIN — Medication 10 ML: at 06:41

## 2017-08-27 RX ADMIN — Medication 10 ML: at 22:05

## 2017-08-27 RX ADMIN — HEPARIN SODIUM 5000 UNITS: 5000 INJECTION, SOLUTION INTRAVENOUS; SUBCUTANEOUS at 13:27

## 2017-08-27 NOTE — PROGRESS NOTES
Urology  No pain. Voiding without difficulty. Some malaise. Intermittent chills and fever. Awake alert NAD  Temp 102.5 BP ok  Abd soft nontender. Blood cultures positive for GNR. A: sepsis post biopsy. Cont abx. Elevated creat. Better.   Marissa Verma MD

## 2017-08-27 NOTE — PROGRESS NOTES
[facility] Dosing Services: Antimicrobial Stewardship Daily Doc    Consult for antibiotic dosing of Vancomycin by Dr. Nicky James  Indication: Bacteremia  Day of Therapy 1    Vancomycin therapy:  LD: 1500mg x1  MD: 750mg q 12 hrs  Dose calculated to approximate a therapeutic trough of 15-20 mcg/mL. Plan for level / Adjustment in Therapy:  Order prior to 3rd MD (not done yet)        Other Antimicrobial   (not dosed by pharmacist) Zosyn 3.375gm q 8hrs   Cultures 8/26: Blood- 1/2 btl + for growth- pending  8/26: Urine- pending   Serum Creatinine Lab Results   Component Value Date/Time    Creatinine 1.28 08/27/2017 05:10 AM         Creatinine Clearance Estimated Creatinine Clearance: 52.7 mL/min (based on Cr of 1.28). Temp Temp: (!) 102.5 °F (39.2 °C)         WBC Lab Results   Component Value Date/Time    WBC 6.6 08/27/2017 05:10 AM        H/H Lab Results   Component Value Date/Time    HGB 12.3 08/27/2017 05:10 AM        Platelets    Lab Results   Component Value Date/Time    PLATELET 978 60/31/8171 05:10 AM          Thank you,  Agata Samuel, Pharm. D.

## 2017-08-27 NOTE — PROGRESS NOTES
[facility]  [address]  [phone]      Medical Progress Note      NAME: Keagan Monaco   :  1950  MRM:  [id number]    Date/Time: 2017         Subjective:     Chief Complaint:  Patient was seen and examined by me. Chart reviewed. Still with fevers, but subjectively felt better       Objective:       Vitals:       Last 24hrs VS reviewed since prior progress note. Most recent are:    Visit Vitals    /59 (BP 1 Location: Right arm, BP Patient Position: At rest)    Pulse (!) 56    Temp 99.8 °F (37.7 °C)    Resp 16    Ht 5' 8\" (1.727 m)    Wt 70.8 kg (156 lb)    SpO2 95%    BMI 23.72 kg/m2     SpO2 Readings from Last 6 Encounters:   17 95%          Intake/Output Summary (Last 24 hours) at 17 1156  Last data filed at 17 0640   Gross per 24 hour   Intake             1936 ml   Output                0 ml   Net             1936 ml        Exam:     Physical Exam:    Gen:  Well-developed, well-nourished, in NAD  HEENT:  Pink conjunctivae, PERRL, hearing intact to voice, moist mucous membranes  Neck:  Supple, without masses, thyroid non-tender  Resp:  No accessory muscle use, clear breath sounds without wheezes rales or rhonchi  Card: Tachy, no murmurs, normal S1, S2 without thrills, bruits or peripheral edema  Abd:  Soft, non-tender, non-distended, normoactive bowel sounds are present  Lymph:  No cervical adenopathy  Musc:  No cyanosis or clubbing  Skin:  No rashes  Neuro:  Cranial nerves 3-12 are grossly intact, follows commands appropriately  Psych:  Alert with good insight.   Oriented to person, place, and time    Medications Reviewed: (see below)    Lab Data Reviewed: (see below)    ______________________________________________________________________    Medications:     Current Facility-Administered Medications   Medication Dose Route Frequency    ibuprofen (MOTRIN) tablet 400 mg  400 mg Oral Q8H PRN    sodium chloride (NS) flush 5-10 mL  5-10 mL IntraVENous PRN    piperacillin-tazobactam (ZOSYN) 3.375 g in 0.9% sodium chloride (MBP/ADV) 100 mL  3.375 g IntraVENous Q8H    0.9% sodium chloride infusion  125 mL/hr IntraVENous CONTINUOUS    sodium chloride (NS) flush 5-10 mL  5-10 mL IntraVENous Q8H    sodium chloride (NS) flush 5-10 mL  5-10 mL IntraVENous PRN    acetaminophen (TYLENOL) tablet 650 mg  650 mg Oral Q4H PRN    HYDROcodone-acetaminophen (NORCO) 5-325 mg per tablet 1 Tab  1 Tab Oral Q4H PRN    HYDROmorphone (PF) (DILAUDID) injection 0.5 mg  0.5 mg IntraVENous Q4H PRN    naloxone (NARCAN) injection 0.4 mg  0.4 mg IntraVENous PRN    diphenhydrAMINE (BENADRYL) injection 12.5 mg  12.5 mg IntraVENous Q4H PRN    ondansetron (ZOFRAN) injection 4 mg  4 mg IntraVENous Q6H PRN    docusate sodium (COLACE) capsule 100 mg  100 mg Oral BID    heparin (porcine) injection 5,000 Units  5,000 Units SubCUTAneous Q8H          Lab Review:     Recent Labs      08/27/17   0510  08/26/17   1346   WBC  6.6  6.4   HGB  12.3  15.0   HCT  36.5*  42.4   PLT  110*  140*     Recent Labs      08/27/17   0510  08/26/17   1346   NA  139  138   K  3.8  4.0   CL  106  101   CO2  25  26   GLU  111*  126*   BUN  12  14   CREA  1.28  1.38*   CA  7.8*  9.0   MG  1.7   --    PHOS  2.8   --    ALB  2.8*  3.9   TBILI  0.5  0.7   SGOT  51*  34   ALT  69  49     No results found for: GLUCPOC       Assessment / Plan:     68 yo hx of HTN, prostate CA, presented w/ sepsis, UTI/prostatitis     1) Sepsis/Complicated UTI (urinary tract infection)/Prostatitis/bacteremia: sepsis POA, still present. Due to recent prostate Bx. Failed outpatient Bactrim. Blood Cx growing 3/4 bottles with gram neg rods. Will repeat blood Cx. Cont IV Zosyn. Urology following     2) Lactic acid acidosis: due to above. Resolved with IVF     3) Renal insufficiency: due to sepsis, dehydration. Renal U/S unremarkable.   Improving with IVF, will monitor BMP     4) Hypertension: hold lisinopril/HCTZ due to sepsis     Code: Full    Total time spent with patient: 35 min                  Care Plan discussed with: Patient, nursing    Discussed:  Care Plan    Prophylaxis:  Hep SQ    Disposition:  Home w/Family           ___________________________________________________    Attending Physician: Shaniqua Guillen MD

## 2017-08-27 NOTE — PROGRESS NOTES
[facility]: MED RECONCILIATION     Comments/Recommendations:   Please see original noted filed by another pharmacist 8/26/17 14:54  Pill bottle arrive and the dose of lisinopril/hctz confirmed to be 20/12.5mg daily  The PTA list has been updated    Prior to Admission Medications   Prescriptions Last Dose Informant Patient Reported? Taking?   acetaminophen (TYLENOL EXTRA STRENGTH) 500 mg tablet 8/25/2017 at AM  Yes Yes   Sig: Take 1,000 mg by mouth daily. lisinopril-hydroCHLOROthiazide (PRINZIDE, ZESTORETIC) 20-12.5 mg per tablet 8/25/2017 at Unknown time  Yes Yes   Sig: Take 1 Tab by mouth daily. sildenafil citrate (VIAGRA) 100 mg tablet   Yes Yes   Sig: Take 100 mg by mouth as needed. trimethoprim-sulfamethoxazole (BACTRIM DS) 160-800 mg per tablet 8/26/2017 at am  Yes Yes   Sig: Take 1 Tab by mouth two (2) times a day.  X 10 days started 8/25/17      Facility-Administered Medications: None      Thank you      Flavia Brown, PharmD, BCPS

## 2017-08-27 NOTE — PROGRESS NOTES
Problem: Falls - Risk of  Goal: *Absence of Falls  Document Cesar Fall Risk and appropriate interventions in the flowsheet.    Outcome: Progressing Towards Goal  Fall Risk Interventions:              Medication Interventions: Patient to call before getting OOB

## 2017-08-27 NOTE — PROGRESS NOTES
Problem: Falls - Risk of  Goal: *Absence of Falls  Document Cesar Fall Risk and appropriate interventions in the flowsheet.    Outcome: Progressing Towards Goal  Fall Risk Interventions:

## 2017-08-27 NOTE — CONSULTS
[facility]   [address] [facility], [address]   [address] [facility]       Name:  Rachel Figueroa   MR#:  [id number]   :  1950   Account #:  [de-identified]    Date of Consultation:  2017   Date of Adm:  2017       REQUESTING PHYSICIAN: Cruz GARCIA Do, MD.    CHIEF COMPLAINT: Fever, post transrectal ultrasound and prostate   biopsy. HISTORY: This is a 68-year-old gentleman who has a history of   prostate cancer and had seen Dr. Lyn Silva recently. He was   evaluated and was noted to have low grade, low risk adenocarcinoma   of the prostate. He subsequently had a followup MRI of his prostate on   2017, showing a lesion suggesting a potential moderate-to-high   risk prostate cancer and was scheduled for transrectal ultrasound,   prostate biopsy. He did have this biopsy done on Thursday and reports   he had a small amount of bleeding after the biopsy when he voided,   and then he started developing fever, chills, malaise and weakness   and presented to the emergency department today. He had no   associated nausea or vomiting but just had this generalized malaise   and weakness and was not improving and presented to the emergency   room. His symptoms have been generalized, exacerbated apparently   with infection after biopsies with an elevated fever and malaise,   alleviated with IV fluids and antibiotics now as I am seeing him at this   time, and he is markedly improved. His history is reviewed. He did have no other associated symptoms   with this. No diarrhea, nausea, vomiting or any other associated   symptoms. PAST MEDICAL HISTORY: Reviewed. ALLERGIES: HE IS ALLERGIC TO CIPRO. MEDICATIONS    1. Lisinopril. 2. Microzide. 3. He was given IV antibiotics with his biopsy. His medical history is also significant for hypertension and low risk   prostate cancer, as mentioned.  He has had orthopedic surgery in the   past but does not have any artificial hips, knees, heart valves. SOCIAL HISTORY: No smoking and occasional alcohol use. REVIEW OF SYSTEMS: As per his chart and the patient. He did have   generalized malaise, which is markedly improved. He is not having any   pain anywhere, minimal testicular discomfort on contact but no other   significant symptoms. He did have a little transient hematuria. PHYSICAL EXAMINATION   GENERAL: He is alert, cooperative, oriented, in no acute distress   currently. VITAL SIGNS: His temperature at this time is 100 degrees, pulse 92. Blood pressure is 112/58. He did have a T-max of 103.2 earlier this   afternoon. HEENT: Normal calvarium without evidence of trauma. Pupils are   equal. Sclerae clear. Neck supple without adenopathy. Nose is normal.   CHEST: No labored breathing. CARDIAC: Regular. ABDOMEN: Soft without masses, tenderness, organomegaly or hernia. GENITAL: Normal testicles and cord structures and scrotal skin, phallic   skin. No inguinal hernia or adenopathy. EXTREMITIES: No edema. SKIN: Visualized portions of his skin are dry and intact. PSYCHIATRIC: Affect is normal.   NEUROLOGIC: Grossly intact. LABORATORY VALUES: White count 6.4, platelets 566. His CO2 is   26, glucose 126, creatinine 1.38. His retroperitoneal ultrasound done with his elevated creatinine   showed no evidence of hydronephrosis, normal echogenicity of the   kidneys, no evidence of stone. Urinary bladder was incompletely   distended. His urinalysis did show 10-20 red cells, 0-4 white cells. ASSESSMENT   1. Probable sepsis post-biopsy. I agree with admission, IV antibiotic   therapy, cultures, both urine and blood cultures. We will follow with   you.   2. History of hematuria, transient, expected post-biopsy. Would expect   this to resolve totally. 3. Elevated creatinine, probably secondary to dehydration. No   evidence of obstruction. Would recommend fluid resuscitation and   repeat labs.  Will follow. 4. Medical problems as outlined. 5. History of prostate cancer. Biopsies are pending at this time, and he   is going to see Dr. Arroyo Older back for further followup and discussion. We   will follow with you.         MD OSMEL Ibarra / Adam Huang   D:  08/26/2017   18:40   T:  08/26/2017   23:37   Job #:  [id number]

## 2017-08-27 NOTE — PROGRESS NOTES
Bedside and Verbal shift change report given to [address] (oncoming nurse) by Jayde Wilkerson RN (offgoing nurse).  Report included the following information SBAR, Kardex, Procedure Summary, Intake/Output, MAR and Recent Results

## 2017-08-27 NOTE — PROGRESS NOTES
Bedside and Verbal shift change report given to [address] (oncoming nurse) by Brittany Oneal (offgoing nurse). Report included the following information SBAR, Kardex, Procedure Summary, Intake/Output, MAR and Recent Results.

## 2017-08-28 LAB
ANION GAP SERPL CALC-SCNC: 8 MMOL/L (ref 5–15)
ATRIAL RATE: 120 BPM
BACTERIA SPEC CULT: NORMAL
BUN SERPL-MCNC: 9 MG/DL (ref 6–20)
BUN/CREAT SERPL: 8 (ref 12–20)
CALCIUM SERPL-MCNC: 7.9 MG/DL (ref 8.5–10.1)
CALCULATED P AXIS, ECG09: 53 DEGREES
CALCULATED R AXIS, ECG10: 78 DEGREES
CALCULATED T AXIS, ECG11: -2 DEGREES
CC UR VC: NORMAL
CHLORIDE SERPL-SCNC: 109 MMOL/L (ref 97–108)
CO2 SERPL-SCNC: 23 MMOL/L (ref 21–32)
CREAT SERPL-MCNC: 1.06 MG/DL (ref 0.7–1.3)
DIAGNOSIS, 93000: NORMAL
ERYTHROCYTE [DISTWIDTH] IN BLOOD BY AUTOMATED COUNT: 13.4 % (ref 11.5–14.5)
GLUCOSE SERPL-MCNC: 126 MG/DL (ref 65–100)
HCT VFR BLD AUTO: 35.7 % (ref 36.6–50.3)
HGB BLD-MCNC: 12 G/DL (ref 12.1–17)
MAGNESIUM SERPL-MCNC: 2 MG/DL (ref 1.6–2.4)
MCH RBC QN AUTO: 28.8 PG (ref 26–34)
MCHC RBC AUTO-ENTMCNC: 33.6 G/DL (ref 30–36.5)
MCV RBC AUTO: 85.8 FL (ref 80–99)
P-R INTERVAL, ECG05: 156 MS
PHOSPHATE SERPL-MCNC: 1.7 MG/DL (ref 2.6–4.7)
PLATELET # BLD AUTO: 96 K/UL (ref 150–400)
POTASSIUM SERPL-SCNC: 3.6 MMOL/L (ref 3.5–5.1)
Q-T INTERVAL, ECG07: 360 MS
QRS DURATION, ECG06: 136 MS
QTC CALCULATION (BEZET), ECG08: 508 MS
RBC # BLD AUTO: 4.16 M/UL (ref 4.1–5.7)
SERVICE CMNT-IMP: NORMAL
SODIUM SERPL-SCNC: 140 MMOL/L (ref 136–145)
VENTRICULAR RATE, ECG03: 120 BPM
WBC # BLD AUTO: 5.2 K/UL (ref 4.1–11.1)

## 2017-08-28 PROCEDURE — 74011250637 HC RX REV CODE- 250/637: Performed by: INTERNAL MEDICINE

## 2017-08-28 PROCEDURE — 83735 ASSAY OF MAGNESIUM: CPT | Performed by: INTERNAL MEDICINE

## 2017-08-28 PROCEDURE — 74011250636 HC RX REV CODE- 250/636: Performed by: INTERNAL MEDICINE

## 2017-08-28 PROCEDURE — 87040 BLOOD CULTURE FOR BACTERIA: CPT | Performed by: INTERNAL MEDICINE

## 2017-08-28 PROCEDURE — 74011000258 HC RX REV CODE- 258: Performed by: INTERNAL MEDICINE

## 2017-08-28 PROCEDURE — 74011000250 HC RX REV CODE- 250: Performed by: INTERNAL MEDICINE

## 2017-08-28 PROCEDURE — 80048 BASIC METABOLIC PNL TOTAL CA: CPT | Performed by: INTERNAL MEDICINE

## 2017-08-28 PROCEDURE — 85027 COMPLETE CBC AUTOMATED: CPT | Performed by: INTERNAL MEDICINE

## 2017-08-28 PROCEDURE — 97161 PT EVAL LOW COMPLEX 20 MIN: CPT

## 2017-08-28 PROCEDURE — 65270000029 HC RM PRIVATE

## 2017-08-28 PROCEDURE — 84100 ASSAY OF PHOSPHORUS: CPT | Performed by: INTERNAL MEDICINE

## 2017-08-28 PROCEDURE — 36415 COLL VENOUS BLD VENIPUNCTURE: CPT | Performed by: INTERNAL MEDICINE

## 2017-08-28 RX ORDER — CALCIUM CARBONATE 200(500)MG
200 TABLET,CHEWABLE ORAL
Status: DISCONTINUED | OUTPATIENT
Start: 2017-08-28 | End: 2017-08-30 | Stop reason: HOSPADM

## 2017-08-28 RX ADMIN — ACETAMINOPHEN 650 MG: 325 TABLET ORAL at 14:10

## 2017-08-28 RX ADMIN — CALCIUM CARBONATE (ANTACID) CHEW TAB 500 MG 200 MG: 500 CHEW TAB at 17:12

## 2017-08-28 RX ADMIN — HEPARIN SODIUM 5000 UNITS: 5000 INJECTION, SOLUTION INTRAVENOUS; SUBCUTANEOUS at 13:32

## 2017-08-28 RX ADMIN — PIPERACILLIN SODIUM,TAZOBACTAM SODIUM 3.38 G: 3; .375 INJECTION, POWDER, FOR SOLUTION INTRAVENOUS at 22:23

## 2017-08-28 RX ADMIN — PIPERACILLIN SODIUM,TAZOBACTAM SODIUM 3.38 G: 3; .375 INJECTION, POWDER, FOR SOLUTION INTRAVENOUS at 05:56

## 2017-08-28 RX ADMIN — HEPARIN SODIUM 5000 UNITS: 5000 INJECTION, SOLUTION INTRAVENOUS; SUBCUTANEOUS at 22:23

## 2017-08-28 RX ADMIN — HEPARIN SODIUM 5000 UNITS: 5000 INJECTION, SOLUTION INTRAVENOUS; SUBCUTANEOUS at 05:53

## 2017-08-28 RX ADMIN — ONDANSETRON 4 MG: 2 INJECTION INTRAMUSCULAR; INTRAVENOUS at 02:07

## 2017-08-28 RX ADMIN — Medication 10 ML: at 05:53

## 2017-08-28 RX ADMIN — ACETAMINOPHEN 650 MG: 325 TABLET ORAL at 02:06

## 2017-08-28 RX ADMIN — SODIUM CHLORIDE 125 ML/HR: 900 INJECTION, SOLUTION INTRAVENOUS at 05:55

## 2017-08-28 RX ADMIN — PIPERACILLIN SODIUM,TAZOBACTAM SODIUM 3.38 G: 3; .375 INJECTION, POWDER, FOR SOLUTION INTRAVENOUS at 14:19

## 2017-08-28 RX ADMIN — HYDROCODONE BITARTRATE AND ACETAMINOPHEN 1 TABLET: 5; 325 TABLET ORAL at 20:05

## 2017-08-28 RX ADMIN — SODIUM CHLORIDE: 900 INJECTION, SOLUTION INTRAVENOUS at 10:08

## 2017-08-28 NOTE — PROGRESS NOTES
8/28/2017  12:07 PM  CM met w/ Pt for needs assessment and D/C planning; charted demographics verified, Pt lives w/ wife Robyn Jackson (C) [phone] in 2 story home w/ bed/bath on GF, the home has 3 sntry steps in the front, 4 in the rear. PTA Pt. was independent w/ ADL's and drives. PCP is Nneka Machado MD [facility]; Pt eusebio Rx coverage and fills through [employer] or [employer]. Pt uses no DME, has had no recent HH. Pt or wife will transport home and to all F/U. Care Management Interventions  PCP Verified by CM: Yes (PCP is Nneka Mahcado MD; last visit May or june)  Palliative Care Consult (Criteria: CHF and RRAT>21): No  Reason for No Palliative Care Consult: Other (see comment) (Pt RRAT is 10)  Mode of Transport at Discharge: Self  Transition of [address] (CM Consult): Discharge Planning  Discharge Durable Medical Equipment: No  Physical Therapy Consult: Yes  Occupational Therapy Consult: Yes  Current Support Network: Lives with Spouse (Lives w/ wife Robyn Jackson ([phone])  Confirm Follow Up Transport: Self  Discharge Location  Discharge Placement: Home  Plan is to D/C to home when stable no current CM needs.   Margarito Neri  Case Management

## 2017-08-28 NOTE — PROGRESS NOTES
Godwin Ackerman [facility] 79  [address] [facility], [address] [place] [address]  [phone]      Medical Progress Note      NAME: Damion Qiuntero   :  1950  MRM:  [id number]    Date/Time: 2017  9:45 AM       Assessment and Plan:   1. Sepsis/ Prostatitis/ bacteremia: sepsis POA.  Due to recent prostate Bx. Failed outpatient Bactrim. Blood Cx growing 3/4 bottles with gram neg rods. Repeat BC so far is negative. still has fever. Cont IV Zosyn. Urology following      2. Lactic acid acidosis: due to above.  Resolved with IVF      3. Renal insufficiency: due to sepsis, dehydration. Renal U/S unremarkable. Improving with IVF, monitor BMP    4. Thrombocytopenia. Tamar Shorten is likely secondary to sepsis. Continue to monitor       5. Hypertension: hold lisinopril/HCTZ due to sepsis. BP stable. 6.  Hypophosphatemia. Replete. Subjective:     Chief Complaint:  Follow up of pt who is admitted with sepsis/ prostatitis. Has still fever. C/o hematuria, clotted blood     ROS:  (bold if positive, if negative)      Tolerating PT  Tolerating Diet        Objective:     Last 24hrs VS reviewed since prior progress note.  Most recent are:    Visit Vitals    /77 (BP 1 Location: Right arm, BP Patient Position: At rest)    Pulse (!) 54    Temp 98.2 °F (36.8 °C)    Resp 21    Ht 5' 8\" (1.727 m)    Wt 92.1 kg (203 lb)    SpO2 97%    BMI 30.87 kg/m2     SpO2 Readings from Last 6 Encounters:   17 97%        No intake or output data in the 24 hours ending 17 0945     Physical Exam:    Gen:  Well-developed, well-nourished, in no acute distress  HEENT:  Pink conjunctivae, PERRL, hearing intact to voice, moist mucous membranes  Neck:  Supple, without masses, thyroid non-tender  Resp:  No accessory muscle use, clear breath sounds without wheezes rales or rhonchi  Card:  No murmurs, normal S1, S2 without thrills, bruits or peripheral edema  Abd:  Soft, non-tender, non-distended, normoactive bowel sounds are present, no palpable organomegaly and no detectable hernias  Lymph:  No cervical or inguinal adenopathy  Musc:  No cyanosis or clubbing  Skin:  No rashes or ulcers, skin turgor is good  Neuro:  Cranial nerves are grossly intact, no focal motor weakness, follows commands appropriately  Psych:  Good insight, oriented to person, place and time, alert  __________________________________________________________________  Medications Reviewed: (see below)  Medications:     Current Facility-Administered Medications   Medication Dose Route Frequency    sodium phosphate 20 mmol in 0.9% sodium chloride 250 mL infusion   IntraVENous ONCE    ibuprofen (MOTRIN) tablet 400 mg  400 mg Oral Q8H PRN    piperacillin-tazobactam (ZOSYN) 3.375 g in 0.9% sodium chloride (MBP/ADV) 100 mL  3.375 g IntraVENous Q8H    0.9% sodium chloride infusion  125 mL/hr IntraVENous CONTINUOUS    sodium chloride (NS) flush 5-10 mL  5-10 mL IntraVENous Q8H    sodium chloride (NS) flush 5-10 mL  5-10 mL IntraVENous PRN    acetaminophen (TYLENOL) tablet 650 mg  650 mg Oral Q4H PRN    HYDROcodone-acetaminophen (NORCO) 5-325 mg per tablet 1 Tab  1 Tab Oral Q4H PRN    HYDROmorphone (PF) (DILAUDID) injection 0.5 mg  0.5 mg IntraVENous Q4H PRN    naloxone (NARCAN) injection 0.4 mg  0.4 mg IntraVENous PRN    diphenhydrAMINE (BENADRYL) injection 12.5 mg  12.5 mg IntraVENous Q4H PRN    ondansetron (ZOFRAN) injection 4 mg  4 mg IntraVENous Q6H PRN    docusate sodium (COLACE) capsule 100 mg  100 mg Oral BID    heparin (porcine) injection 5,000 Units  5,000 Units SubCUTAneous Q8H        Lab Data Reviewed: (see below)  Lab Review:     Recent Labs      08/28/17   0317  08/27/17   0510  08/26/17   1346   WBC  5.2  6.6  6.4   HGB  12.0*  12.3  15.0   HCT  35.7*  36.5*  42.4   PLT  96*  110*  140*     Recent Labs      08/28/17   0317  08/27/17   0510  08/26/17   1346   NA  140  139  138   K  3.6  3.8  4.0   CL  109*  106  101   CO2  23  25  26 GLU  126*  111*  126*   BUN  9  12  14   CREA  1.06  1.28  1.38*   CA  7.9*  7.8*  9.0   MG  2.0  1.7   --    PHOS  1.7*  2.8   --    ALB   --   2.8*  3.9   TBILI   --   0.5  0.7   SGOT   --   51*  34   ALT   --   69  49     No results found for: GLUCPOC  No results for input(s): PH, PCO2, PO2, HCO3, FIO2 in the last 72 hours. No results for input(s): INR in the last 72 hours. No lab exists for component: INREXT  All Micro Results     Procedure Component Value Units Date/Time    CULTURE, BLOOD [[id number]] Collected:  08/28/17 0317    Order Status:  Completed Specimen:  Blood from Blood Updated:  08/28/17 0911     Special Requests: NO SPECIAL REQUESTS        Culture result: NO GROWTH AFTER 5 HOURS       CULTURE, URINE [[id number]] Collected:  08/26/17 1355    Order Status:  Completed Specimen:  Urine from Clean catch Updated:  08/28/17 0827     Special Requests: NO SPECIAL REQUESTS        [place] Count <1,000 CFU/ML        Culture result: NO GROWTH 2 DAYS       CULTURE, BLOOD [[id number]] Collected:  08/28/17 0325    Order Status:  Completed Specimen:  Blood from Blood Updated:  08/28/17 0821     Special Requests: NO SPECIAL REQUESTS        Culture result: NO GROWTH AFTER 4 HOURS       CULTURE, BLOOD [[id number]]  (Abnormal) Collected:  08/26/17 1346    Order Status:  Completed Specimen:  Blood from Blood Updated:  08/28/17 0720     Special Requests: NO SPECIAL REQUESTS        Culture result:         GRAM NEGATIVE RODS GROWING IN BOTH BOTTLES DRAWN (SITE = R AC) (PLEASE REFER TO [facility] [id number], ALSO COLLECTED 8/28/17, FOR IDENTIFICATION AND SENSITIVITIES) (A)              PRELIMINARY REPORT OF GRAM NEGATIVE RODS GROWING IN 1 OF 2 BOTTLES DRAWN CALLED TO AND READ BACK BY ROMEO Choe RN ON 8/27/17 AT 0926 TA.  (A)    CULTURE, BLOOD [[id number]]  (Abnormal) Collected:  08/26/17 1355    Order Status:  Completed Specimen:  Blood from Blood Updated:  08/27/17 0955     Special Requests: NO SPECIAL REQUESTS Culture result:         GRAM NEGATIVE RODS GROWING IN BOTH BOTTLES DRAWN (SITE = L AC) (A)              PRELIMINARY REPORT OF GRAM NEGATIVE RODS GROWING IN 1 OF 2 BOTTLES DRAWN CALLED TO AND READ BACK BY [address], RN ON 8/27/17 AT 0926 TA. (A)          I have reviewed notes of prior 24hr. Other pertinent lab:       Total time spent with patient: [address] discussed with: Patient, Nursing Staff and >50% of time spent in counseling and coordination of care    Discussed:  Care Plan    Prophylaxis:  Hep SQ    Disposition:  Home w/Family           ___________________________________________________    Attending Physician: Dany Bishop MD

## 2017-08-28 NOTE — ROUTINE PROCESS
Bedside and Verbal shift change report given to [address] (oncoming nurse) by Will RN (offgoing nurse). Report included the following information SBAR, Kardex, Intake/Output and MAR.

## 2017-08-28 NOTE — PROGRESS NOTES
physical Therapy EVALUATION/DISCHARGE  Patient: Rox Monreal (32 y.o. male)  Date: 8/28/2017  [facility]        Precautions: None     ASSESSMENT :  Based on the objective data described below, the patient presents with normal/baseline strength, good functional mobility, and steady gait following admission for sepsis 2/2 UTI. PTA patient was independent with all mobility, worked part time in consulting and was active in the community. Patient lives with his wife in a two level home but lives primarily on the first level. Currently, patient is independent with all aspects of functional mobility. Patient ambulated 300' without any difficulty, pain or signs of fatigue. Patient left in bed at the conclusion of PT evaluation. Patient presents at baseline level of function at this time and skilled PT services are not indicated at this time. Will complete order. .    Skilled physical therapy is not indicated at this time. PLAN :  Discharge Recommendations: None  Further Equipment Recommendations for Discharge: none     SUBJECTIVE:   Patient stated I'm fine, I don't need any help getting around.     OBJECTIVE DATA SUMMARY:   HISTORY:    Past Medical History:   Diagnosis Date    HTN (hypertension)     Prostate CA ([facility]      Past Surgical History:   Procedure Laterality Date    HX ORTHOPAEDIC       Prior Level of Function/Home Situation:   Personal factors and/or comorbidities impacting plan of care:     Home Situation  Home Environment: Private residence (Patient very active PTA, works and does not use any AD)  # Steps to Enter: 3  One/Two Story Residence: Two story, live on 1st floor  # of Interior Steps: 13  Living Alone: No  Support Systems: Family member(s), Spouse/Significant Other/Partner  Patient Expects to be Discharged to[de-identified] Private residence  Current DME Used/Available at Home: None    EXAMINATION/PRESENTATION/DECISION MAKING:   Critical Behavior:  Neurologic State: Alert  Orientation Level: Appropriate for age, Oriented X4  Cognition: Appropriate decision making, Appropriate for age attention/concentration, Appropriate safety awareness, Follows commands     Hearing: Auditory  Auditory Impairment: None  Skin:  Intact  Edema: None  Range Of Motion:  AROM: Within functional limits           PROM: Within functional limits           Strength:    Strength: Within functional limits                    Tone & Sensation:   Tone: Normal              Sensation: Intact               Coordination:  Coordination: Within functional limits  Vision:      Functional Mobility:  Bed Mobility:  Rolling: Independent  Supine to Sit: Independent  Sit to Supine: Independent  Scooting: Independent  Transfers:  Sit to Stand: Independent  Stand to Sit: Independent        Bed to Chair: Independent              Balance:   Sitting: Intact; Without support  Standing: Intact; Without support  Ambulation/Gait Training:  Distance (ft): 300 Feet (ft)  Assistive Device: Gait belt  Ambulation - Level of Assistance: Independent                                                    Stairs:  NT but no limitations noted that should preclude ability to climb stairs                Therapeutic Exercises:   N/A    Functional Measure:  Barthel Index:    Bathin  Bladder: 10  Bowels: 10  Groomin  Dressing: 10  Feeding: 10  Mobility: 15  Stairs: 10  Toilet Use: 10  Transfer (Bed to Chair and Back): 15  Total: 100       Barthel and G-code impairment scale:  Percentage of impairment CH  0% CI  1-19% CJ  20-39% CK  40-59% CL  60-79% CM  80-99% CN  100%   Barthel Score 0-100 100 99-80 79-60 59-40 20-39 1-19   0   Barthel Score 0-20 20 17-19 13-16 9-12 5-8 1-4 0      The Barthel ADL Index: Guidelines  1. The index should be used as a record of what a patient does, not as a record of what a patient could do. 2. The main aim is to establish degree of independence from any help, physical or verbal, however minor and for whatever reason.   3. The need for supervision renders the patient not independent. 4. A patient's performance should be established using the best available evidence. Asking the patient, friends/relatives and nurses are the usual sources, but direct observation and common sense are also important. However direct testing is not needed. 5. Usually the patient's performance over the preceding 24-48 hours is important, but occasionally longer periods will be relevant. 6. Middle categories imply that the patient supplies over 50 per cent of the effort. 7. Use of aids to be independent is allowed. Rodolfo Quinones., BarthelDILLON. (3056). Functional evaluation: the Barthel Index. [facility] (14)2. [facility] SusanSSM Health Cardinal Glennon Children's HospitalMICHELLE babinF, [place]., Mirella Muñoz., [place], [address] (1999). Measuring the change indisability after inpatient rehabilitation; comparison of the responsiveness of the Barthel Index and Functional [place] Measure. Journal of Neurology, Neurosurgery, and Psychiatry, 66(4), 605-047. Amos Donohue, N.[place].A, RAY Velez, & Dottie Spencer M.A. (2004.) Assessment of post-stroke quality of life in cost-effectiveness studies: The usefulness of the Barthel Index and the EuroQoL-5D. Quality of Life Research, 13, 265-93       G codes: In compliance with CMSs Claims Based Outcome Reporting, the following G-code set was chosen for this patient based on their primary functional limitation being treated: The outcome measure chosen to determine the severity of the functional limitation was the Barthel with a score of 100/100 which was correlated with the impairment scale.     ? Mobility - Walking and Moving Around:     - CURRENT STATUS: CH - 0% impaired, limited or restricted    - GOAL STATUS: CH - 0% impaired, limited or restricted    - D/C STATUS:  CH - 0% impaired, limited or restricted          Physical Therapy Evaluation Charge Determination   History Examination Presentation Decision-Making   LOW Complexity : Zero comorbidities / personal factors that will impact the outcome / POC MEDIUM Complexity : 3 Standardized tests and measures addressing body structure, function, activity limitation and / or participation in recreation  LOW Complexity : Stable, uncomplicated  Other outcome measures Barthel 100/100  LOW       Based on the above components, the patient evaluation is determined to be of the following complexity level: LOW     Pain:Pain Scale 1: Numeric (0 - 10)  Pain Intensity 1: 0     Activity Tolerance:   Slightly reduced from baseline but [facility]    Please refer to the flowsheet for vital signs taken during this treatment. After treatment:   []   Patient left in no apparent distress sitting up in chair  [x]   Patient left in no apparent distress in bed  [x]   Call bell left within reach  [x]   Nursing notified  []   Caregiver present  []   Bed alarm activated    COMMUNICATION/EDUCATION:   Communication/Collaboration:  [x]   Fall prevention education was provided and the patient/caregiver indicated understanding. [x]   Patient/family have participated as able and agree with findings and recommendations. []   Patient is unable to participate in plan of care at this time.   Findings and recommendations were discussed with: Registered Nurse    Thank you for this referral.  Lulu Bryson, PT   Time Calculation: 18 mins

## 2017-08-28 NOTE — PROGRESS NOTES
Progress Note    Patient: Patricio Arizmendi MRN: [id number]  SSN: [id number]    YOB: 1950  Age: 69 y.o. Sex: male        ADMITTED:  2017 to Gilford Saunders, MD  for Sepsis [facility])         Patricio Arizmendi was admitted for Sepsis [facility]). S/p px biopsy    Feels ok, voiding ok, still with chills. C/o clots in urine. Minimal. Last void clear    C/o loose stool    Vitals:  Temp (24hrs), Av.7 °F (37.6 °C), Min:98.2 °F (36.8 °C), Max:102.1 °F (38.9 °C)     Blood pressure 146/77, pulse (!) 54, temperature 98.2 °F (36.8 °C), resp. rate 21, height 5' 8\" (1.727 m), weight 92.1 kg (203 lb), SpO2 97 %. I&O's:   1901 -  0700  In: 1936 [P.O.:600; I.V.:1336]  Out: -          Exam:   NAD.       Labs:   Recent Labs      17   0317  17   0510  17   1346   WBC  5.2  6.6  6.4   HGB  12.0*  12.3  15.0   HCT  35.7*  36.5*  42.4   PLT  96*  110*  140*     Recent Labs      177  17   0510  17   1346   NA  140  139  138   K  3.6  3.8  4.0   CL  109*  106  101   CO2  23  25  26   GLU  126*  111*  126*   BUN  9  12  14   CREA  1.06  1.28  1.38*   CA  7.9*  7.8*  9.0        Cultures:   pending   Imaging:       Assessment:     - Principal Problem:    Sepsis ([address] (2017)    Active Problems:    Complicated UTI (urinary tract infection) (2017)      Prostatitis (2017)      Lactic acid acidosis (2017)      Renal insufficiency (2017)      Hypertension (2017)        Plan:     - await cx, adjust abx  - arf, cr improving  - hematuria secondary to bx  - c diff cx if diarrhea continues    Signed By: Cal White MD - 2017

## 2017-08-28 NOTE — PROGRESS NOTES
Bedside and Verbal shift change report given to ROSANNA Galvez (oncoming nurse) by Isaias Mcgovern RN (offgoing nurse). Report included the following information SBAR, Kardex and Recent Results.

## 2017-08-29 LAB
APPEARANCE UR: CLEAR
BACTERIA SPEC CULT: ABNORMAL
BACTERIA URNS QL MICRO: NEGATIVE /HPF
BASOPHILS # BLD: 0 K/UL (ref 0–0.1)
BASOPHILS NFR BLD: 1 % (ref 0–1)
BILIRUB UR QL: NEGATIVE
COLOR UR: ABNORMAL
EOSINOPHIL # BLD: 0 K/UL (ref 0–0.4)
EOSINOPHIL NFR BLD: 1 % (ref 0–7)
EPITH CASTS URNS QL MICRO: ABNORMAL /LPF
ERYTHROCYTE [DISTWIDTH] IN BLOOD BY AUTOMATED COUNT: 13.4 % (ref 11.5–14.5)
GLUCOSE UR STRIP.AUTO-MCNC: NEGATIVE MG/DL
HCT VFR BLD AUTO: 33.5 % (ref 36.6–50.3)
HGB BLD-MCNC: 11.4 G/DL (ref 12.1–17)
HGB UR QL STRIP: ABNORMAL
HYALINE CASTS URNS QL MICRO: ABNORMAL /LPF (ref 0–5)
KETONES UR QL STRIP.AUTO: NEGATIVE MG/DL
LEUKOCYTE ESTERASE UR QL STRIP.AUTO: NEGATIVE
LYMPHOCYTES # BLD: 1 K/UL (ref 0.8–3.5)
LYMPHOCYTES NFR BLD: 20 % (ref 12–49)
MCH RBC QN AUTO: 29.5 PG (ref 26–34)
MCHC RBC AUTO-ENTMCNC: 34 G/DL (ref 30–36.5)
MCV RBC AUTO: 86.6 FL (ref 80–99)
MONOCYTES # BLD: 0.8 K/UL (ref 0–1)
MONOCYTES NFR BLD: 17 % (ref 5–13)
NEUTS SEG # BLD: 3 K/UL (ref 1.8–8)
NEUTS SEG NFR BLD: 61 % (ref 32–75)
NITRITE UR QL STRIP.AUTO: NEGATIVE
PH UR STRIP: 6 [PH] (ref 5–8)
PLATELET # BLD AUTO: 107 K/UL (ref 150–400)
PROT UR STRIP-MCNC: NEGATIVE MG/DL
RBC # BLD AUTO: 3.87 M/UL (ref 4.1–5.7)
RBC #/AREA URNS HPF: ABNORMAL /HPF (ref 0–5)
SERVICE CMNT-IMP: ABNORMAL
SERVICE CMNT-IMP: ABNORMAL
SP GR UR REFRACTOMETRY: 1 (ref 1–1.03)
UA: UC IF INDICATED,UAUC: ABNORMAL
UROBILINOGEN UR QL STRIP.AUTO: 0.2 EU/DL (ref 0.2–1)
WBC # BLD AUTO: 4.9 K/UL (ref 4.1–11.1)
WBC URNS QL MICRO: ABNORMAL /HPF (ref 0–4)

## 2017-08-29 PROCEDURE — 87077 CULTURE AEROBIC IDENTIFY: CPT | Performed by: INTERNAL MEDICINE

## 2017-08-29 PROCEDURE — 74011250636 HC RX REV CODE- 250/636: Performed by: INTERNAL MEDICINE

## 2017-08-29 PROCEDURE — 81001 URINALYSIS AUTO W/SCOPE: CPT | Performed by: INTERNAL MEDICINE

## 2017-08-29 PROCEDURE — 65270000029 HC RM PRIVATE

## 2017-08-29 PROCEDURE — 85025 COMPLETE CBC W/AUTO DIFF WBC: CPT | Performed by: INTERNAL MEDICINE

## 2017-08-29 PROCEDURE — 74011250637 HC RX REV CODE- 250/637: Performed by: INTERNAL MEDICINE

## 2017-08-29 PROCEDURE — 74011000258 HC RX REV CODE- 258: Performed by: INTERNAL MEDICINE

## 2017-08-29 PROCEDURE — 87045 FECES CULTURE AEROBIC BACT: CPT | Performed by: INTERNAL MEDICINE

## 2017-08-29 PROCEDURE — 36415 COLL VENOUS BLD VENIPUNCTURE: CPT | Performed by: INTERNAL MEDICINE

## 2017-08-29 PROCEDURE — 89055 LEUKOCYTE ASSESSMENT FECAL: CPT | Performed by: INTERNAL MEDICINE

## 2017-08-29 RX ADMIN — ONDANSETRON 4 MG: 2 INJECTION INTRAMUSCULAR; INTRAVENOUS at 08:02

## 2017-08-29 RX ADMIN — HYDROCODONE BITARTRATE AND ACETAMINOPHEN 1 TABLET: 5; 325 TABLET ORAL at 04:50

## 2017-08-29 RX ADMIN — HYDROCODONE BITARTRATE AND ACETAMINOPHEN 1 TABLET: 5; 325 TABLET ORAL at 17:36

## 2017-08-29 RX ADMIN — SODIUM CHLORIDE 100 ML/HR: 900 INJECTION, SOLUTION INTRAVENOUS at 14:15

## 2017-08-29 RX ADMIN — CEFTRIAXONE 2 G: 2 INJECTION, POWDER, FOR SOLUTION INTRAMUSCULAR; INTRAVENOUS at 11:51

## 2017-08-29 RX ADMIN — CEFTRIAXONE 2 G: 2 INJECTION, POWDER, FOR SOLUTION INTRAMUSCULAR; INTRAVENOUS at 21:01

## 2017-08-29 RX ADMIN — HEPARIN SODIUM 5000 UNITS: 5000 INJECTION, SOLUTION INTRAVENOUS; SUBCUTANEOUS at 21:02

## 2017-08-29 RX ADMIN — SODIUM CHLORIDE 125 ML/HR: 900 INJECTION, SOLUTION INTRAVENOUS at 04:56

## 2017-08-29 RX ADMIN — Medication 10 ML: at 14:15

## 2017-08-29 RX ADMIN — HEPARIN SODIUM 5000 UNITS: 5000 INJECTION, SOLUTION INTRAVENOUS; SUBCUTANEOUS at 05:05

## 2017-08-29 RX ADMIN — PIPERACILLIN SODIUM,TAZOBACTAM SODIUM 3.38 G: 3; .375 INJECTION, POWDER, FOR SOLUTION INTRAVENOUS at 05:06

## 2017-08-29 RX ADMIN — HEPARIN SODIUM 5000 UNITS: 5000 INJECTION, SOLUTION INTRAVENOUS; SUBCUTANEOUS at 14:55

## 2017-08-29 RX ADMIN — Medication 10 ML: at 05:05

## 2017-08-29 RX ADMIN — Medication 10 ML: at 21:01

## 2017-08-29 NOTE — PROGRESS NOTES
MD notified at this time regarding pts stool not being liquid enough to be processed for c.diff. Per MD, if stool continues to stay that way, we can d/c enteric precautions.

## 2017-08-29 NOTE — PROGRESS NOTES
Bedside and Verbal shift change report given to Jeffy Bird RN (oncoming nurse) by Dia Aiken RN (offgoing nurse). Report included the following information SBAR, Kardex, ED Summary and Recent Results.

## 2017-08-29 NOTE — PROGRESS NOTES
Called MD regarding pts recent weight being 194 pounds. Pt reports he always has weighed \"max 180 pounds. \" He is concerned about retaining fluid. Per MD, stop fluids and continue to monitor weight. Pt updated.

## 2017-08-29 NOTE — PROGRESS NOTES
Progress Note    Patient: Debbie Rowe MRN: [id number]  SSN: [id number]    YOB: 1950  Age: 69 y.o. Sex: male        ADMITTED:  2017 to Jenn Valles MD  for Sepsis [facility])         Debbie Rowe was admitted for Sepsis [facility]). ecoli in cx, second cx neg. Feels ok    cipro caused joint pain in the past    Vitals:  Temp (24hrs), Av.9 °F (37.2 °C), Min:98 °F (36.7 °C), Max:99.4 °F (37.4 °C)     Blood pressure 144/80, pulse (!) 52, temperature 99 °F (37.2 °C), resp. rate 19, height 5' 8\" (1.727 m), weight 91.9 kg (202 lb 9.6 oz), SpO2 96 %. I&O's:            Exam:   NAD.       Labs:   Recent Labs      17   0353  17   0317  17   0510   WBC  4.9  5.2  6.6   HGB  11.4*  12.0*  12.3   HCT  33.5*  35.7*  36.5*   PLT  107*  96*  110*     Recent Labs      17   0317  17   0510   NA  140  139   K  3.6  3.8   CL  109*  106   CO2  23  25   GLU  126*  111*   BUN  9  12   CREA  1.06  1.28   CA  7.9*  7.8*        Cultures:      Imaging:       Assessment:     - Principal Problem:    Sepsis ([address] (2017)    Active Problems:    Complicated UTI (urinary tract infection) (2017)      Prostatitis (2017)      Lactic acid acidosis (2017)      Renal insufficiency (2017)      Hypertension (2017)        Plan:     - sepsis post bx, switched to ceftriaxone, if afebrile hopefully home in am on oral cephalosporin  - loose stool, c diff pending  - op follow up with dr Alida Mcdaniel next tuesday    Signed By: Froy Whitt MD - 2017

## 2017-08-29 NOTE — PROGRESS NOTES
Godwin Ackerman [facility] [address]  [address], [place], [address] [facility]  [phone]      Medical Progress Note      NAME: Edil Ferrer   :  1950  MRM:  [id number]    Date/Time: 2017  9:45 AM       Assessment and Plan:   1. Sepsis/ Prostatitis/ bacteremia: sepsis POA.  Due to recent prostate Bx. Failed outpatient Bactrim. Blood Cx E coli. Repeat BC so far is negative. Change ABx to ceftriaxone. Urology following      2. Lactic acid acidosis: due to above.  Resolved with IVF      3. Renal insufficiency: due to sepsis, dehydration. Renal U/S unremarkable. Improving with IVF, monitor BMP    4. Thrombocytopenia. This is likely secondary to sepsis. Continue to monitor       5. Hypertension: hold lisinopril/HCTZ due to sepsis. BP stable. 6.  Hypophosphatemia. Replete. 7.  Diarrhea/ fever. Check stool for c diff    8. Hematuria. Likely secondary to recent prostate Bx. Subjective:     Chief Complaint:  Follow up of pt who is admitted with sepsis/ prostatitis. C/o diarrhea, intermittent hematuria, clotted blood     ROS:  (bold if positive, if negative)      Tolerating PT  Tolerating Diet        Objective:     Last 24hrs VS reviewed since prior progress note.  Most recent are:    Visit Vitals    /76 (BP 1 Location: Right arm, BP Patient Position: At rest)    Pulse (!) 53    Temp 99.2 °F (37.3 °C)    Resp 18    Ht 5' 8\" (1.727 m)    Wt 91.9 kg (202 lb 9.6 oz)    SpO2 96%    BMI 30.81 kg/m2     SpO2 Readings from Last 6 Encounters:   17 96%        No intake or output data in the 24 hours ending 17 1048     Physical Exam:    Gen:  Well-developed, well-nourished, in no acute distress  HEENT:  Pink conjunctivae, PERRL, hearing intact to voice, moist mucous membranes  Neck:  Supple, without masses, thyroid non-tender  Resp:  No accessory muscle use, clear breath sounds without wheezes rales or rhonchi  Card:  No murmurs, normal S1, S2 without thrills, bruits or peripheral edema  Abd:  Soft, non-tender, non-distended, normoactive bowel sounds are present, no palpable organomegaly and no detectable hernias  Lymph:  No cervical or inguinal adenopathy  Musc:  No cyanosis or clubbing  Skin:  No rashes or ulcers, skin turgor is good  Neuro:  Cranial nerves are grossly intact, no focal motor weakness, follows commands appropriately  Psych:  Good insight, oriented to person, place and time, alert  __________________________________________________________________  Medications Reviewed: (see below)  Medications:     Current Facility-Administered Medications   Medication Dose Route Frequency    calcium carbonate (TUMS) chewable tablet 200 mg [elemental]  200 mg Oral TID WITH MEALS    ibuprofen (MOTRIN) tablet 400 mg  400 mg Oral Q8H PRN    piperacillin-tazobactam (ZOSYN) 3.375 g in 0.9% sodium chloride (MBP/ADV) 100 mL  3.375 g IntraVENous Q8H    0.9% sodium chloride infusion  125 mL/hr IntraVENous CONTINUOUS    sodium chloride (NS) flush 5-10 mL  5-10 mL IntraVENous Q8H    sodium chloride (NS) flush 5-10 mL  5-10 mL IntraVENous PRN    acetaminophen (TYLENOL) tablet 650 mg  650 mg Oral Q4H PRN    HYDROcodone-acetaminophen (NORCO) 5-325 mg per tablet 1 Tab  1 Tab Oral Q4H PRN    HYDROmorphone (PF) (DILAUDID) injection 0.5 mg  0.5 mg IntraVENous Q4H PRN    naloxone (NARCAN) injection 0.4 mg  0.4 mg IntraVENous PRN    diphenhydrAMINE (BENADRYL) injection 12.5 mg  12.5 mg IntraVENous Q4H PRN    ondansetron (ZOFRAN) injection 4 mg  4 mg IntraVENous Q6H PRN    docusate sodium (COLACE) capsule 100 mg  100 mg Oral BID    heparin (porcine) injection 5,000 Units  5,000 Units SubCUTAneous Q8H        Lab Data Reviewed: (see below)  Lab Review:     Recent Labs      08/29/17   0353  08/28/17   0317  08/27/17   0510   WBC  4.9  5.2  6.6   HGB  11.4*  12.0*  12.3   HCT  33.5*  35.7*  36.5*   PLT  107*  96*  110*     Recent Labs      08/28/17 0317  08/27/17   0510 08/26/17   1346   NA  140  139  138   K  3.6  3.8  4.0   CL  109*  106  101   CO2  23  25  26   GLU  126*  111*  126*   BUN  9  12  14   CREA  1.06  1.28  1.38*   CA  7.9*  7.8*  9.0   MG  2.0  1.7   --    PHOS  1.7*  2.8   --    ALB   --   2.8*  3.9   TBILI   --   0.5  0.7   SGOT   --   51*  34   ALT   --   69  49     No results found for: GLUCPOC  No results for input(s): PH, PCO2, PO2, HCO3, FIO2 in the last 72 hours. No results for input(s): INR in the last 72 hours. No lab exists for component: INREXT, Barbara Congress  All Micro Results     Procedure Component Value Units Date/Time    CULTURE, BLOOD [[id number]] Collected:  08/28/17 0325    Order Status:  Completed Specimen:  Blood from Blood Updated:  08/29/17 0829     Special Requests: NO SPECIAL REQUESTS        Culture result: NO GROWTH 1 DAY       CULTURE, BLOOD [[id number]] Collected:  08/28/17 0317    Order Status:  Completed Specimen:  Blood from Blood Updated:  08/29/17 0829     Special Requests: NO SPECIAL REQUESTS        Culture result: NO GROWTH 1 DAY       CULTURE, URINE [[id number]] Collected:  08/26/17 1355    Order Status:  Completed Specimen:  Urine from Clean catch Updated:  08/28/17 0827     Special Requests: NO SPECIAL REQUESTS        [place] Count <1,000 CFU/ML        Culture result: NO GROWTH 2 DAYS       CULTURE, BLOOD [[id number]]  (Abnormal) Collected:  08/26/17 1346    Order Status:  Completed Specimen:  Blood from Blood Updated:  08/28/17 0720     Special Requests: NO SPECIAL REQUESTS        Culture result:         GRAM NEGATIVE RODS GROWING IN BOTH BOTTLES DRAWN (SITE = R AC) (PLEASE REFER TO [facility] [id number], ALSO COLLECTED 8/28/17, FOR IDENTIFICATION AND SENSITIVITIES) (A)              PRELIMINARY REPORT OF GRAM NEGATIVE RODS GROWING IN 1 OF 2 BOTTLES DRAWN CALLED TO AND READ BACK BY ROMEO Lara RN ON 8/27/17 AT 0926 TA.  (A)    CULTURE, BLOOD [[id number]]  (Abnormal) Collected:  08/26/17 1355    Order Status:  Completed Specimen:  Blood from Blood Updated:  08/27/17 0955     Special Requests: NO SPECIAL REQUESTS        Culture result:         GRAM NEGATIVE RODS GROWING IN BOTH BOTTLES DRAWN (SITE = L AC) (A)              PRELIMINARY REPORT OF GRAM NEGATIVE RODS GROWING IN 1 OF 2 BOTTLES DRAWN CALLED TO AND READ BACK BY [address], RN ON 8/27/17 AT 0926 TA. (A)          I have reviewed notes of prior 24hr. Other pertinent lab:       Total time spent with patient: [address] discussed with: Patient, Nursing Staff and >50% of time spent in counseling and coordination of care    Discussed:  Care Plan    Prophylaxis:  Hep SQ    Disposition:  Home w/Family           ___________________________________________________    Attending Physician: Araceli Heredia MD

## 2017-08-30 VITALS
TEMPERATURE: 97.6 F | DIASTOLIC BLOOD PRESSURE: 78 MMHG | BODY MASS INDEX: 29.4 KG/M2 | WEIGHT: 194 LBS | OXYGEN SATURATION: 97 % | SYSTOLIC BLOOD PRESSURE: 142 MMHG | RESPIRATION RATE: 18 BRPM | HEIGHT: 68 IN | HEART RATE: 47 BPM

## 2017-08-30 PROBLEM — A41.9 SEPSIS (HCC): Status: RESOLVED | Noted: 2017-08-26 | Resolved: 2017-08-30

## 2017-08-30 PROBLEM — N39.0 COMPLICATED UTI (URINARY TRACT INFECTION): Status: RESOLVED | Noted: 2017-08-26 | Resolved: 2017-08-30

## 2017-08-30 LAB
ANION GAP SERPL CALC-SCNC: 7 MMOL/L (ref 5–15)
BASOPHILS # BLD: 0 K/UL (ref 0–0.1)
BASOPHILS NFR BLD: 1 % (ref 0–1)
BUN SERPL-MCNC: 6 MG/DL (ref 6–20)
BUN/CREAT SERPL: 6 (ref 12–20)
CALCIUM SERPL-MCNC: 8.5 MG/DL (ref 8.5–10.1)
CHLORIDE SERPL-SCNC: 107 MMOL/L (ref 97–108)
CO2 SERPL-SCNC: 28 MMOL/L (ref 21–32)
CREAT SERPL-MCNC: 0.96 MG/DL (ref 0.7–1.3)
EOSINOPHIL # BLD: 0.1 K/UL (ref 0–0.4)
EOSINOPHIL NFR BLD: 1 % (ref 0–7)
ERYTHROCYTE [DISTWIDTH] IN BLOOD BY AUTOMATED COUNT: 13.3 % (ref 11.5–14.5)
GLUCOSE SERPL-MCNC: 90 MG/DL (ref 65–100)
HCT VFR BLD AUTO: 35.5 % (ref 36.6–50.3)
HGB BLD-MCNC: 11.9 G/DL (ref 12.1–17)
LYMPHOCYTES # BLD: 1.1 K/UL (ref 0.8–3.5)
LYMPHOCYTES NFR BLD: 21 % (ref 12–49)
MCH RBC QN AUTO: 29.1 PG (ref 26–34)
MCHC RBC AUTO-ENTMCNC: 33.5 G/DL (ref 30–36.5)
MCV RBC AUTO: 86.8 FL (ref 80–99)
MONOCYTES # BLD: 0.9 K/UL (ref 0–1)
MONOCYTES NFR BLD: 17 % (ref 5–13)
NEUTS SEG # BLD: 3.2 K/UL (ref 1.8–8)
NEUTS SEG NFR BLD: 60 % (ref 32–75)
PHOSPHATE SERPL-MCNC: 2.7 MG/DL (ref 2.6–4.7)
PLATELET # BLD AUTO: 131 K/UL (ref 150–400)
POTASSIUM SERPL-SCNC: 3.5 MMOL/L (ref 3.5–5.1)
RBC # BLD AUTO: 4.09 M/UL (ref 4.1–5.7)
SODIUM SERPL-SCNC: 142 MMOL/L (ref 136–145)
WBC # BLD AUTO: 5.3 K/UL (ref 4.1–11.1)
WBC #/AREA STL HPF: NORMAL /HPF (ref 0–4)

## 2017-08-30 PROCEDURE — 74011250636 HC RX REV CODE- 250/636: Performed by: INTERNAL MEDICINE

## 2017-08-30 PROCEDURE — 74011000258 HC RX REV CODE- 258: Performed by: INTERNAL MEDICINE

## 2017-08-30 PROCEDURE — 84100 ASSAY OF PHOSPHORUS: CPT | Performed by: INTERNAL MEDICINE

## 2017-08-30 PROCEDURE — 36415 COLL VENOUS BLD VENIPUNCTURE: CPT | Performed by: INTERNAL MEDICINE

## 2017-08-30 PROCEDURE — 80048 BASIC METABOLIC PNL TOTAL CA: CPT | Performed by: INTERNAL MEDICINE

## 2017-08-30 PROCEDURE — 85025 COMPLETE CBC W/AUTO DIFF WBC: CPT | Performed by: INTERNAL MEDICINE

## 2017-08-30 PROCEDURE — 74011250637 HC RX REV CODE- 250/637: Performed by: INTERNAL MEDICINE

## 2017-08-30 RX ORDER — CEFDINIR 300 MG/1
300 CAPSULE ORAL 2 TIMES DAILY
Qty: 20 CAP | Refills: 0 | Status: SHIPPED | OUTPATIENT
Start: 2017-08-30

## 2017-08-30 RX ADMIN — HEPARIN SODIUM 5000 UNITS: 5000 INJECTION, SOLUTION INTRAVENOUS; SUBCUTANEOUS at 07:39

## 2017-08-30 RX ADMIN — LISINOPRIL: 20 TABLET ORAL at 12:58

## 2017-08-30 RX ADMIN — CALCIUM CARBONATE (ANTACID) CHEW TAB 500 MG 200 MG: 500 CHEW TAB at 15:37

## 2017-08-30 RX ADMIN — Medication 10 ML: at 07:39

## 2017-08-30 RX ADMIN — ACETAMINOPHEN 650 MG: 325 TABLET ORAL at 07:38

## 2017-08-30 RX ADMIN — CEFTRIAXONE 2 G: 2 INJECTION, POWDER, FOR SOLUTION INTRAMUSCULAR; INTRAVENOUS at 09:07

## 2017-08-30 NOTE — PROGRESS NOTES
Occupational Therapy    Acknowledged OT orders, completed chart review and discussed patient with nursing. Nursing reports patient is up ad luisito and recently walked in the hallway. Introduced role of OT to patient, patient with no concerns regarding self care tasks or returning home. He demonstrated ability to get out of bed and ambulated in room to complete item retrieval task. Will complete orders at this time.        Thank you,    Belinda Walters OTR/L

## 2017-08-30 NOTE — DISCHARGE INSTRUCTIONS
ACUTE DIAGNOSES:  Sepsis ([facility].)    CHRONIC MEDICAL DIAGNOSES:  Problem List as of 8/30/2017  Date Reviewed: 8/30/2017          Codes Class Noted - Resolved    Prostatitis ICD-10-CM: N41.9  ICD-9-CM: 601.9  8/26/2017 - Present        Lactic acid acidosis ICD-10-CM: E87.2  ICD-9-CM: 276.2  8/26/2017 - Present        Renal insufficiency ICD-10-CM: N28.9  ICD-9-CM: 593.9  8/26/2017 - Present        Hypertension (Chronic) ICD-10-CM: I10  ICD-9-CM: 401.9  8/26/2017 - Present        * (Principal)RESOLVED: Sepsis ([facility] ICD-10-CM: A41.9  ICD-9-CM: 038.9, 995.91  8/26/2017 - 8/30/2017        RESOLVED: Complicated UTI (urinary tract infection) ICD-10-CM: N39.0  ICD-9-CM: 599.0  8/26/2017 - 8/30/2017              DISCHARGE MEDICATIONS:          · It is important that you take the medication exactly as they are prescribed. · Keep your medication in the bottles provided by the pharmacist and keep a list of the medication names, dosages, and times to be taken in your wallet. · Do not take other medications without consulting your doctor. DIET:  Regular Diet    ACTIVITY: Activity as tolerated    ADDITIONAL INFORMATION: If you experience any of the following symptoms then please call your primary care physician or return to the emergency room if you cannot get hold of your doctor: Fever, chills, nausea, vomiting, diarrhea, change in mentation, falling, bleeding, shortness of breath. FOLLOW UP CARE:  Dr. Reina Khan MD  you are to call and set up an appointment to see them in 2 weeks. Follow-up with Dr Brina Salazar in 1-2 weeks      Information obtained by :  I understand that if any problems occur once I am at home I am to contact my physician. I understand and acknowledge receipt of the instructions indicated above.                                                                                                                                            Physician's or R.N.'s Signature Date/Time                                                                                                                                              Patient or Representative Signature                                                          Date/Time

## 2017-08-30 NOTE — PROGRESS NOTES
Godwin Ackerman [facility] [address]  [address] [facility], [address] [place] [address]  [phone]      Medical Progress Note      NAME: Jaison Beatty   :  1950  MRM:  [id number]    Date/Time: 2017  9:45 AM       Assessment and Plan:   1. Sepsis/ Prostatitis/ bacteremia: sepsis POA.  Due to recent prostate Bx. Failed outpatient Bactrim. Blood Cx E coli. Repeat BC so far is negative. Change ABx to ceftriaxone. Urology following      2. Lactic acid acidosis: due to above.  Resolved with IVF      3. Renal insufficiency: due to sepsis, dehydration. Renal U/S unremarkable. Improving with IVF, monitor BMP    4. Thrombocytopenia. This is likely secondary to sepsis. Continue to monitor       5. Hypertension: Resume  lisinopril/HCTZ. BP stable. 6.  Hypophosphatemia. Replete. 7.  Diarrhea/ fever. Resolved. Will start lactobacillus. 8.  Hematuria. Likely secondary to recent prostate Bx. FU with             Subjective:     Chief Complaint:  Follow up of pt who is admitted with sepsis/ prostatitis. C/o diarrhea, intermittent hematuria, clotted blood     ROS:  (bold if positive, if negative)      Tolerating PT  Tolerating Diet        Objective:     Last 24hrs VS reviewed since prior progress note.  Most recent are:    Visit Vitals    /80 (BP 1 Location: Right arm, BP Patient Position: At rest)    Pulse 64    Temp 99.7 °F (37.6 °C)    Resp 18    Ht 5' 8\" (1.727 m)    Wt 88 kg (194 lb 0.1 oz)    SpO2 94%    BMI 29.5 kg/m2     SpO2 Readings from Last 6 Encounters:   17 94%            Intake/Output Summary (Last 24 hours) at 17 1046  Last data filed at 17 1933   Gross per 24 hour   Intake                0 ml   Output              550 ml   Net             -550 ml        Physical Exam:    Gen:  Well-developed, well-nourished, in no acute distress  HEENT:  Pink conjunctivae, PERRL, hearing intact to voice, moist mucous membranes  Neck:  Supple, without masses, thyroid non-tender  Resp:  No accessory muscle use, clear breath sounds without wheezes rales or rhonchi  Card:  No murmurs, normal S1, S2 without thrills, bruits or peripheral edema  Abd:  Soft, non-tender, non-distended, normoactive bowel sounds are present, no palpable organomegaly and no detectable hernias  Lymph:  No cervical or inguinal adenopathy  Musc:  No cyanosis or clubbing  Skin:  No rashes or ulcers, skin turgor is good  Neuro:  Cranial nerves are grossly intact, no focal motor weakness, follows commands appropriately  Psych:  Good insight, oriented to person, place and time, alert  __________________________________________________________________  Medications Reviewed: (see below)  Medications:     Current Facility-Administered Medications   Medication Dose Route Frequency    cefTRIAXone (ROCEPHIN) 2 g in 0.9% sodium chloride (MBP/ADV) 50 mL  2 g IntraVENous Q12H    calcium carbonate (TUMS) chewable tablet 200 mg [elemental]  200 mg Oral TID WITH MEALS    ibuprofen (MOTRIN) tablet 400 mg  400 mg Oral Q8H PRN    sodium chloride (NS) flush 5-10 mL  5-10 mL IntraVENous Q8H    sodium chloride (NS) flush 5-10 mL  5-10 mL IntraVENous PRN    acetaminophen (TYLENOL) tablet 650 mg  650 mg Oral Q4H PRN    HYDROcodone-acetaminophen (NORCO) 5-325 mg per tablet 1 Tab  1 Tab Oral Q4H PRN    HYDROmorphone (PF) (DILAUDID) injection 0.5 mg  0.5 mg IntraVENous Q4H PRN    naloxone (NARCAN) injection 0.4 mg  0.4 mg IntraVENous PRN    diphenhydrAMINE (BENADRYL) injection 12.5 mg  12.5 mg IntraVENous Q4H PRN    ondansetron (ZOFRAN) injection 4 mg  4 mg IntraVENous Q6H PRN    docusate sodium (COLACE) capsule 100 mg  100 mg Oral BID    heparin (porcine) injection 5,000 Units  5,000 Units SubCUTAneous Q8H        Lab Data Reviewed: (see below)  Lab Review:     Recent Labs      08/30/17   0231  08/29/17   0353  08/28/17   0317   WBC  5.3  4.9  5.2   HGB  11.9*  11.4*  12.0*   HCT  35.5*  33.5*  35.7*   PLT  131*  107*  96* Recent Labs      08/30/17   0231  08/28/17   0317   NA  142  140   K  3.5  3.6   CL  107  109*   CO2  28  23   GLU  90  126*   BUN  6  9   CREA  0.96  1.06   CA  8.5  7.9*   MG   --   2.0   PHOS  2.7  1.7*     No results found for: GLUCPOC  No results for input(s): PH, PCO2, PO2, HCO3, FIO2 in the last 72 hours. No results for input(s): INR in the last 72 hours. No lab exists for component: INREXT, INREXT  All Micro Results     Procedure Component Value Units Date/Time    CULTURE, STOOL [[id number]] Collected:  08/29/17 1430    Order Status:  Completed Specimen:  Stool Updated:  08/30/17 0949     Special Requests: NO SPECIAL REQUESTS        Culture result:         NO ROUTINE ENTERIC PATHOGENS ISOLATED INCLUDING SALMONELLA, SHIGELLA, YERSINIA, VIBRIO OR SHIGA TOXIN PRODUCING E. COLI SO FAR    CULTURE, BLOOD [[id number]] Collected:  08/28/17 0325    Order Status:  Completed Specimen:  Blood from Blood Updated:  08/30/17 0715     Special Requests: NO SPECIAL REQUESTS        Culture result: NO GROWTH 2 DAYS       CULTURE, BLOOD [[id number]] Collected:  08/28/17 0317    Order Status:  Completed Specimen:  Blood from Blood Updated:  08/30/17 0715     Special Requests: NO SPECIAL REQUESTS        Culture result: NO GROWTH 2 DAYS       C. DIFFICILE (DNA) [[id number]] Collected:  08/29/17 1100    Order Status:  Canceled     CULTURE, BLOOD [[id number]]  (Abnormal) Collected:  08/26/17 1346    Order Status:  Completed Specimen:  Blood from Blood Updated:  08/29/17 1051     Special Requests: NO SPECIAL REQUESTS        Culture result:         ESCHERICHIA COLI GROWING IN BOTH BOTTLES DRAWN (SITE = R AC) (PLEASE REFER TO [facility] [id number], ALSO COLLECTED 8/28/17, FOR  SENSITIVITIES) (A)              PRELIMINARY REPORT OF GRAM NEGATIVE RODS GROWING IN 1 OF 2 BOTTLES DRAWN CALLED TO AND READ BACK BY ROMEO Pastrana RN ON 8/27/17 AT 0926 TA.  (A)    CULTURE, BLOOD [[id number]]  (Abnormal)  (Susceptibility) Collected:  08/26/17 1355 Order Status:  Completed Specimen:  Blood from Blood Updated:  08/29/17 1050     Special Requests: NO SPECIAL REQUESTS        Culture result:         ESCHERICHIA COLI GROWING IN BOTH BOTTLES DRAWN (SITE = LAC) (A)              PRELIMINARY REPORT OF GRAM NEGATIVE RODS GROWING IN 1 OF 2 BOTTLES DRAWN CALLED TO AND READ BACK BY ROMEO Terrazas RN ON 8/27/17 AT 0926 TA. (A)    CULTURE, URINE [[id number]] Collected:  08/26/17 1355    Order Status:  Completed Specimen:  Urine from Clean catch Updated:  08/28/17 0827     Special Requests: NO SPECIAL REQUESTS        [place] Count <1,000 CFU/ML        Culture result: NO GROWTH 2 DAYS             I have reviewed notes of prior 24hr. Other pertinent lab:       Total time spent with patient: [address] discussed with: Patient, Nursing Staff and >50% of time spent in counseling and coordination of care    Discussed:  Care Plan    Prophylaxis:  Hep SQ    Disposition:  Home w/Family           ___________________________________________________    Attending Physician: Leslie Cortes MD

## 2017-08-30 NOTE — ROUTINE PROCESS
Bedside and Verbal shift change report given to Esteban Shore RN (oncoming nurse) by Halina Lema RN (offgoing nurse). Report included the following information SBAR, Kardex, MAR, Accordion, Recent Results and Med Rec Status.

## 2017-08-30 NOTE — PROGRESS NOTES
I have reviewed discharge instructions with the patient and spouse. The patient and spouse verbalized understanding. Discharge medications reviewed with patient and spouse and appropriate educational materials and side effects teaching were provided. Patient verbalized he would only be following up with his urologist and not a PCP.

## 2017-08-31 LAB
BACTERIA SPEC CULT: NORMAL
C JEJUNI+C COLI AG STL QL: NEGATIVE
E COLI SXT1+2 STL IA: NEGATIVE
SERVICE CMNT-IMP: NORMAL

## 2017-09-03 LAB
BACTERIA SPEC CULT: NORMAL
BACTERIA SPEC CULT: NORMAL
SERVICE CMNT-IMP: NORMAL
SERVICE CMNT-IMP: NORMAL

## 2017-10-04 ENCOUNTER — HOSPITAL ENCOUNTER (OUTPATIENT)
Dept: RADIATION THERAPY | Age: 67
Discharge: HOME | End: 2017-10-04

## 2019-05-31 ENCOUNTER — HOSPITAL ENCOUNTER (OUTPATIENT)
Dept: PHYSICAL THERAPY | Age: 69
Discharge: HOME | End: 2019-05-31
Payer: MEDICARE

## 2019-05-31 PROCEDURE — 97140 MANUAL THERAPY 1/> REGIONS: CPT | Performed by: PHYSICAL THERAPY ASSISTANT

## 2019-05-31 PROCEDURE — 97162 PT EVAL MOD COMPLEX 30 MIN: CPT | Performed by: PHYSICAL THERAPY ASSISTANT

## 2019-05-31 PROCEDURE — 97110 THERAPEUTIC EXERCISES: CPT | Performed by: PHYSICAL THERAPY ASSISTANT

## 2019-05-31 NOTE — PROGRESS NOTES
PT INITIAL EVALUATION NOTE - [facility] 2-15    Patient Name: Carmina Hi  Date:2019  : 1950  [x]  Patient  Verified  Payor: Betty Risa / Plan: [place] MEDICARE PART A & B / Product Type: Medicare /    In time:8:00 am  Out time:8:55 am  Total Treatment Time (min): 55  Total Timed Codes (min): 25  1:1 Treatment Time ( [address] only): 25   Visit #: 1     Treatment Area: Right foot pain [M79.671]    SUBJECTIVE  Pain Level (0-10 scale): 2  Any medication changes, allergies to medications, adverse drug reactions, diagnosis change, or new procedure performed?: [] No    [x] Yes (see summary sheet for update)  Subjective:    Pt complains of R foot pain that has bothered him since he fell off a ladder in  and was s/p ORIF of trimalleolar fracture. Pt had hardware removed 8 weeks after procedure. Pt continues to have pain and difficulty w/ walking on uneven surfaces. Pt likes to travel and walk for exercise. Pt states that at times his foot feels like it is going to give way and most of his pain is on top of his ankle. PLOF: walking  Mechanism of Injury: fall off ladder  Previous Treatment/Compliance: good  PMHx/Surgical Hx: see chart  Work Hx: semi-retired  Living Situation: w/ wife 2 story home  Pt Goals: \"decrease pain when walking and standing\"  Barriers: chronic pain  Motivation: good  Substance use: none  FABQ Score: see FOTO  Cognition: A & O x 4        OBJECTIVE/EXAMINATION  Posture:   Forward trunk lean  Gait and Functional Mobility:  Decreased stance time on R LE  Palpation: TTP over talus  Incision: healed normally  SLS on R LE: 5\", on L 10\"  Unable to perform SL heel raise      Ankle ROM: 5 deg DF, 40 deg PF, inv 5 deg, ev 5 deg      LOWER QUARTER   MUSCLE STRENGTH  KEY       R  L  0 - No Contraction  L1, L2 Psoas  5  5  1 - Trace   L3 Quads  5  5  2 - Poor   L4 Tib Ant  4+  4  3 - Fair    L5 EHL  4  4  4 - Good   S1 FHL  5  5  5 - Normal   S2 Hams  5  5          MMT: no pain  Neurological: Reflexes / Sensations: normal  Special Tests: NT    15 min Therapeutic Exercise:  [x] See flow sheet :   Rationale: increase ROM, increase strength, improve coordination, improve balance and increase proprioception to improve the patients ability to ambulate on uneven surfaces    10 min Manual Therapy: passive stretch in to ankle DF w/ A/P mobs on talus grade 3-4, calcaneal mobs in to ankle inv/ev, ankle DF mob w/ movement in half kneel    Rationale: decrease pain, increase ROM and increase tissue extensibility to improve the patients ability to ambulate    With   [] TE   [] TA   [] neuro   [] other: Patient Education: [x] Review HEP    [] Progressed/Changed HEP based on:   [] positioning   [] body mechanics   [] transfers   [] heat/ice application    [] other:      Other Objective/Functional Measures: NT    Pain Level (0-10 scale) post treatment: 1    ASSESSMENT/Changes in Function:     [x]  See Plan of Care      Gm Lacey, PT, DPT 5/31/2019

## 2019-05-31 NOTE — PROGRESS NOTES
[address] [place] [address]  Phone: [phone]  Fax: [phone]    Plan of Care/Statement of Necessity for Physical Therapy Services  2-15    Patient name: Carmina iH  : 1950  Provider#: [id number]  Referral source: Kavon Jeffries DPM      Medical/Treatment Diagnosis: Right foot pain [M79.671]     Prior Hospitalization: see medical history     Comorbidities: see chart  Prior Level of Function: walking for exercise  Medications: Verified on Patient Summary List  Start of Care: 19      Onset Date:    The [address] [place] [address] and following information is based on the information from the initial evaluation. Assessment/ key information: Pt has signs and symptoms of chronic ankle instability and decreased proprioception from previous surgery and scar tissue that affect his ability to ambulate, navigate stairs, static and dynamic balance. Pt is a good candidate for therapy. Evaluation Complexity History MEDIUM  Complexity : 1-2 comorbidities / personal factors will impact the outcome/ POC ; Examination MEDIUM Complexity : 3 Standardized tests and measures addressing body structure, function, activity limitation and / or participation in recreation  ;Presentation MEDIUM Complexity : Evolving with changing characteristics  ; Clinical Decision Making MEDIUM Complexity : FOTO score of 26-74  Overall Complexity Rating: MEDIUM    Problem List: pain affecting function, decrease ROM, decrease strength, edema affecting function, impaired gait/ balance, decrease ADL/ functional abilitiies, decrease activity tolerance and decrease flexibility/ joint mobility   Treatment Plan may include any combination of the following: Therapeutic exercise, Therapeutic activities, Neuromuscular re-education, Physical agent/modality, Gait/balance training, Manual therapy and Patient education  Patient / Family readiness to learn indicated by: asking questions  Persons(s) to be included in education: patient (P)  Barriers to Learning/Limitations: None  Patient Goal (s): decrease pain when walking  Patient Self Reported Health Status: good  Rehabilitation Potential: fair    Short Term Goals: To be accomplished in 2 weeks:  Pt will be independent w/ HEP in order to take active role in therapy  Pt will report 0/10 pain when in standing in order to cook w/o pain  Pt will demonstrate 10\" SLS in order to improve static balance  Long Term Goals: To be accomplished in 6 weeks:  Pt will report over 10 point improvement on FOTO in order to improve functional mobility  Pt will be able to walk 1 mile w/ no increase in pain in order to return to exercise  Pt will demonstrate over 9 deg of ankle DF in order to ambulate w/ more normal gait pattern  Frequency / Duration: Patient to be seen 1 times per week for 6 weeks. Patient/ Caregiver education and instruction: self care    [x]  Plan of care has been reviewed with PTA        Certification Period: 5/31/19-8/31/19  Maxime Lacey, PT, DPT 5/31/2019     ________________________________________________________________________    I certify that the above Therapy Services are being furnished while the patient is under my care. I agree with the treatment plan and certify that this therapy is necessary.     [de-identified] Signature:____________________  Date:____________Time: _________

## 2019-06-04 ENCOUNTER — HOSPITAL ENCOUNTER (OUTPATIENT)
Dept: PHYSICAL THERAPY | Age: 69
Discharge: HOME | End: 2019-06-04
Payer: MEDICARE

## 2019-06-04 PROCEDURE — 97110 THERAPEUTIC EXERCISES: CPT | Performed by: PHYSICAL THERAPY ASSISTANT

## 2019-06-04 PROCEDURE — 97140 MANUAL THERAPY 1/> REGIONS: CPT | Performed by: PHYSICAL THERAPY ASSISTANT

## 2019-06-04 NOTE — PROGRESS NOTES
PT DAILY TREATMENT NOTE - [facility] 2-15    Patient Name: Hao Hawkins  Date:2019  : 1950  [x]  Patient  Verified  Payor: [place] / Plan: [place] MED[place] / Product Type: Medicare /    In time:1:00 pm  Out time:1:45 pm  Total Treatment Time (min): 45  Total Timed Codes (min): 45  1:1 Treatment Time ( [address] only): 39   Visit #:  2    Treatment Area: Right foot pain [M79.671]    SUBJECTIVE  Pain Level (0-10 scale): \"sore\"  Any medication changes, allergies to medications, adverse drug reactions, diagnosis change, or new procedure performed?: [x] No    [] Yes (see summary sheet for update)  Subjective functional status/changes:   [] No changes reported  Pt states his foot is a little sore today and he thinks it is from being on his feet a lot this weekend. OBJECTIVE    35 min Therapeutic Exercise:  [x] See flow sheet :   Rationale: increase ROM, increase strength and improve coordination to improve the patients ability to ambulate w/o pain    10  min Manual Therapy: passive ankle distraction, A/P talar glides grade 3-4, A/P distal fibular glides grade 3-4    Rationale: decrease pain, increase ROM, increase tissue extensibility and decrease trigger points to improve the patients ability to ambulate          With   [] TE   [] TA   [] neuro   [] other: Patient Education: [x] Review HEP    [] Progressed/Changed HEP based on:   [] positioning   [] body mechanics   [] transfers   [] heat/ice application    [] other:      Other Objective/Functional Measures: NT     Pain Level (0-10 scale) post treatment: 0    ASSESSMENT/Changes in Function:   Pt tolerated there-ex well w/ no increase in pain. Pt demonstrates lateral instability w/ SL stance on foam and reaching.     Patient will continue to benefit from skilled PT services to modify and progress therapeutic interventions, address functional mobility deficits, address ROM deficits, address strength deficits, analyze and address soft tissue restrictions, analyze and cue movement patterns and analyze and modify body mechanics/ergonomics to attain remaining goals.      []  See Plan of Care  []  See progress note/recertification  []  See Discharge Summary         Progress towards goals / Updated goals:  NT    PLAN  [x]  Upgrade activities as tolerated     [x]  Continue plan of care  []  Update interventions per flow sheet       []  Discharge due to:_  []  Other:_      Evi Kim, PT, DPT 6/4/2019

## 2019-06-11 ENCOUNTER — HOSPITAL ENCOUNTER (OUTPATIENT)
Dept: PHYSICAL THERAPY | Age: 69
Discharge: HOME | End: 2019-06-11
Payer: MEDICARE

## 2019-06-11 PROCEDURE — 97110 THERAPEUTIC EXERCISES: CPT | Performed by: PHYSICAL THERAPY ASSISTANT

## 2019-06-11 PROCEDURE — 97140 MANUAL THERAPY 1/> REGIONS: CPT | Performed by: PHYSICAL THERAPY ASSISTANT

## 2019-06-11 NOTE — PROGRESS NOTES
PT DAILY TREATMENT NOTE - [facility] 2-15    Patient Name: Minh Man  Date:2019  : 1950  [x]  Patient  Verified  Payor: Michelle Graham / Plan: [place] MED[place]ARE PART A & B / Product Type: Medicare /    In time:1:00 pm  Out time:1:50 pm  Total Treatment Time (min): 50  Total Timed Codes (min): 50  1:1 Treatment Time ( [address] only): 50  Visit #:  3    Treatment Area: Right foot pain [M79.671]    SUBJECTIVE  Pain Level (0-10 scale): \"sore\"  Any medication changes, allergies to medications, adverse drug reactions, diagnosis change, or new procedure performed?: [x] No    [] Yes (see summary sheet for update)  Subjective functional status/changes:   [] No changes reported  Pt states his foot is still sore from mowing the lawn and doing his exercises. Pt reports he had no soreness after last session. OBJECTIVE    40 min Therapeutic Exercise:  [x] See flow sheet :   Rationale: increase ROM, increase strength and improve coordination to improve the patients ability to ambulate w/o pain    10  min Manual Therapy: passive ankle distraction, A/P talar glides grade 3-4, A/P distal fibular glides grade 3-4    Rationale: decrease pain, increase ROM, increase tissue extensibility and decrease trigger points to improve the patients ability to ambulate          With   [] TE   [] TA   [] neuro   [] other: Patient Education: [x] Review HEP    [] Progressed/Changed HEP based on:   [] positioning   [] body mechanics   [] transfers   [] heat/ice application    [] other:      Other Objective/Functional Measures: NT     Pain Level (0-10 scale) post treatment: 0    ASSESSMENT/Changes in Function:   Pt was able to tolerate increased weight bearing exercises w/o pain. Pt was TTP over ATFL and distal to lateral malleolus.   Patient will continue to benefit from skilled PT services to modify and progress therapeutic interventions, address functional mobility deficits, address ROM deficits, address strength deficits, analyze and address soft tissue restrictions, analyze and cue movement patterns and analyze and modify body mechanics/ergonomics to attain remaining goals.      []  See Plan of Care  []  See progress note/recertification  []  See Discharge Summary         Progress towards goals / Updated goals:  NT    PLAN  [x]  Upgrade activities as tolerated     [x]  Continue plan of care  []  Update interventions per flow sheet       []  Discharge due to:_  []  Other:_      Roselia Jones PT, DPT 6/11/2019

## 2019-06-18 ENCOUNTER — HOSPITAL ENCOUNTER (OUTPATIENT)
Dept: PHYSICAL THERAPY | Age: 69
Discharge: HOME | End: 2019-06-18
Payer: MEDICARE

## 2019-06-18 PROCEDURE — 97140 MANUAL THERAPY 1/> REGIONS: CPT | Performed by: PHYSICAL THERAPY ASSISTANT

## 2019-06-18 PROCEDURE — 97110 THERAPEUTIC EXERCISES: CPT | Performed by: PHYSICAL THERAPY ASSISTANT

## 2019-06-18 NOTE — PROGRESS NOTES
PT DAILY TREATMENT NOTE - [facility] 2-15    Patient Name: Zafar Course  Date:2019  : 1950  [x]  Patient  Verified  Payor: Kristel Cabrera / Plan: [place] MEDICARE PART A & B / Product Type: Medicare /    In time:3:00 pm  Out time:3:50 pm  Total Treatment Time (min): 50  Total Timed Codes (min): 50  1:1 Treatment Time ( [address] only): 50  Visit #:  4    Treatment Area: Right foot pain [M79.671]    SUBJECTIVE  Pain Level (0-10 scale): 0/10  Any medication changes, allergies to medications, adverse drug reactions, diagnosis change, or new procedure performed?: [x] No    [] Yes (see summary sheet for update)  Subjective functional status/changes:   [] No changes reported  Pt states he was sore the day after last session on the outside of his foot but he was fine the next day. OBJECTIVE    40 min Therapeutic Exercise:  [x] See flow sheet :   Rationale: increase ROM, increase strength and improve coordination to improve the patients ability to ambulate w/o pain    10  min Manual Therapy: passive ankle distraction, A/P talar glides grade 3-4, A/P distal fibular glides grade 3-4    Rationale: decrease pain, increase ROM, increase tissue extensibility and decrease trigger points to improve the patients ability to ambulate          With   [] TE   [] TA   [] neuro   [] other: Patient Education: [x] Review HEP    [] Progressed/Changed HEP based on:   [] positioning   [] body mechanics   [] transfers   [] heat/ice application    [] other:      Other Objective/Functional Measures: NT     Pain Level (0-10 scale) post treatment: 0    ASSESSMENT/Changes in Function:   Pt tolerated there-ex well today w/ no increase in pain. Pt demonstrates TTP just distal to lateral malleolus.   Patient will continue to benefit from skilled PT services to modify and progress therapeutic interventions, address functional mobility deficits, address ROM deficits, address strength deficits, analyze and address soft tissue restrictions, analyze and cue movement patterns and analyze and modify body mechanics/ergonomics to attain remaining goals.      []  See Plan of Care  []  See progress note/recertification  []  See Discharge Summary         Progress towards goals / Updated goals:  NT    PLAN  [x]  Upgrade activities as tolerated     [x]  Continue plan of care  []  Update interventions per flow sheet       []  Discharge due to:_  []  Other:_      Bethanie Degroot PT, DPT 6/18/2019

## 2019-06-25 ENCOUNTER — APPOINTMENT (OUTPATIENT)
Dept: PHYSICAL THERAPY | Age: 69
End: 2019-06-25
Payer: MEDICARE

## 2019-07-02 ENCOUNTER — APPOINTMENT (OUTPATIENT)
Dept: PHYSICAL THERAPY | Age: 69
End: 2019-07-02
Payer: MEDICARE

## 2019-07-09 ENCOUNTER — HOSPITAL ENCOUNTER (OUTPATIENT)
Dept: PHYSICAL THERAPY | Age: 69
Discharge: HOME | End: 2019-07-09
Payer: MEDICARE

## 2019-07-09 PROCEDURE — 97140 MANUAL THERAPY 1/> REGIONS: CPT | Performed by: PHYSICAL THERAPY ASSISTANT

## 2019-07-09 PROCEDURE — 97110 THERAPEUTIC EXERCISES: CPT | Performed by: PHYSICAL THERAPY ASSISTANT

## 2019-07-09 NOTE — PROGRESS NOTES
PT DAILY TREATMENT NOTE - [facility] 2-15    Patient Name: Rodney Urban  Date:2019  : 1950  [x]  Patient  Verified  Payor: Kelechi Lerner / Plan: [place] MEDICARE PART A & B / Product Type: Medicare /    In time:5:00 pm  Out time:5;40  pm  Total Treatment Time (min): 40  Total Timed Codes (min): 40  1:1 Treatment Time ( [address] only): 40  Visit #:  5    Treatment Area: Right foot pain [M79.671]    SUBJECTIVE  Pain Level (0-10 scale): 0/10  Any medication changes, allergies to medications, adverse drug reactions, diagnosis change, or new procedure performed?: [x] No    [] Yes (see summary sheet for update)  Subjective functional status/changes:   [] No changes reported  Pt states he just returned from his trip to [place] and did not experience any foot pain while down there. Pt reports he is 100% improved w/ no issues. OBJECTIVE    30 min Therapeutic Exercise:  [x] See flow sheet :   Rationale: increase ROM, increase strength and improve coordination to improve the patients ability to ambulate w/o pain    10  min Manual Therapy: passive ankle distraction, A/P talar glides grade 3-4, A/P distal fibular glides grade 3-4    Rationale: decrease pain, increase ROM, increase tissue extensibility and decrease trigger points to improve the patients ability to ambulate          With   [] TE   [] TA   [] neuro   [] other: Patient Education: [x] Review HEP    [] Progressed/Changed HEP based on:   [] positioning   [] body mechanics   [] transfers   [] heat/ice application    [] other:      Other Objective/Functional Measures: SLS: 20 sec    R ankle DF: 10 deg  R ankle inversion: 20 deg    Able to perform 5 SL heel raises w/o pain     Pain Level (0-10 scale) post treatment: 0    ASSESSMENT/Changes in Function:   Pt made very good progress over 5 visits w/ PT and is now completely pain free and fully functional at home. Pt responded well to manual techniques and proprioceptive exercises.   Pt was given updated HEP and educated on contacting MD if questions arise.     []  See Plan of Care  []  See progress note/recertification  [x]  See Discharge Summary         Progress towards goals / Updated goals:  See D/c    PLAN  []  Upgrade activities as tolerated     []  Continue plan of care  []  Update interventions per flow sheet       [x]  Discharge due to:_Met goals  []  Other:_      Ree Raisa PT, DPT, OCS 7/9/2019

## 2019-08-27 NOTE — PROGRESS NOTES
[facility]   [address] [facility] [address]  Phone: [phone] Fax: [phone]      Discharge Summary 2-15    Patient name: Terri Arizmendi  : 1950  Provider#: [id number]  Referral source: Jessica Richard DPM      Medical/Treatment Diagnosis: Right foot pain [M79.671]     Prior Hospitalization: see medical history     Comorbidities: See Plan of Care  Prior Level of Function: See Plan of Care  Medications: Verified on Patient Summary List    Start of Care: 19      Onset Date:few months ago   Visits from Start of Care: 5     Missed Visits: 0  Reporting Period : 19 to 19    Objective/Functional Measures: SLS: 20 sec     R ankle DF: 10 deg  R ankle inversion: 20 deg     Able to perform 5 SL heel raises w/o pain      Pain Level (0-10 scale) post treatment: 0     ASSESSMENT/Changes in Function:   Pt made very good progress over 5 visits w/ PT and is now completely pain free and fully functional at home. Pt responded well to manual techniques and proprioceptive exercises. Pt was given updated HEP and educated     Short Term Goals: To be accomplished in 2 weeks:  Pt will be independent w/ HEP in order to take active role in therapy MET  Pt will report 0/10 pain when in standing in order to cook w/o pain MET  Pt will demonstrate 10\" SLS in order to improve static balance MET  Long Term Goals:  To be accomplished in 6 weeks:  Pt will report over 10 point improvement on FOTO in order to improve functional mobility MET  Pt will be able to walk 1 mile w/ no increase in pain in order to return to exercise MET  Pt will demonstrate over 9 deg of ankle DF in order to ambulate w/ more normal gait pattern MET      RECOMMENDATIONS:  [x]Discontinue therapy: [x]Patient has reached or is progressing toward set goals     []Patient is non-compliant or has abdicated     []Due to lack of appreciable progress towards set goals     []Other  Lima Lacey, PT, DPT, OCS 8/27/2019